# Patient Record
Sex: FEMALE | Race: WHITE | URBAN - METROPOLITAN AREA
[De-identification: names, ages, dates, MRNs, and addresses within clinical notes are randomized per-mention and may not be internally consistent; named-entity substitution may affect disease eponyms.]

---

## 2017-10-31 ENCOUNTER — OFFICE VISIT (OUTPATIENT)
Dept: PEDIATRICS | Facility: CLINIC | Age: 32
End: 2017-10-31
Payer: COMMERCIAL

## 2017-10-31 VITALS
BODY MASS INDEX: 38.42 KG/M2 | HEART RATE: 86 BPM | SYSTOLIC BLOOD PRESSURE: 118 MMHG | DIASTOLIC BLOOD PRESSURE: 82 MMHG | TEMPERATURE: 98.2 F | WEIGHT: 230.6 LBS | HEIGHT: 65 IN | OXYGEN SATURATION: 99 %

## 2017-10-31 DIAGNOSIS — Z00.00 ENCOUNTER FOR ROUTINE ADULT HEALTH EXAMINATION WITHOUT ABNORMAL FINDINGS: Primary | ICD-10-CM

## 2017-10-31 DIAGNOSIS — G43.009 MIGRAINE WITHOUT AURA AND WITHOUT STATUS MIGRAINOSUS, NOT INTRACTABLE: ICD-10-CM

## 2017-10-31 DIAGNOSIS — Z23 NEED FOR PROPHYLACTIC VACCINATION AND INOCULATION AGAINST INFLUENZA: ICD-10-CM

## 2017-10-31 PROCEDURE — 90686 IIV4 VACC NO PRSV 0.5 ML IM: CPT | Performed by: INTERNAL MEDICINE

## 2017-10-31 PROCEDURE — 99395 PREV VISIT EST AGE 18-39: CPT | Mod: 25 | Performed by: INTERNAL MEDICINE

## 2017-10-31 PROCEDURE — 90471 IMMUNIZATION ADMIN: CPT | Performed by: INTERNAL MEDICINE

## 2017-10-31 PROCEDURE — 99213 OFFICE O/P EST LOW 20 MIN: CPT | Mod: 25 | Performed by: INTERNAL MEDICINE

## 2017-10-31 RX ORDER — SUMATRIPTAN 25 MG/1
25-50 TABLET, FILM COATED ORAL
Qty: 18 TABLET | Refills: 1 | Status: SHIPPED | OUTPATIENT
Start: 2017-10-31 | End: 2018-10-24

## 2017-10-31 NOTE — PROGRESS NOTES
Injectable Influenza Immunization Documentation    1.  Is the person to be vaccinated sick today?   No    2. Does the person to be vaccinated have an allergy to a component   of the vaccine?   No  Egg Allergy Algorithm Link    3. Has the person to be vaccinated ever had a serious reaction   to influenza vaccine in the past?   No    4. Has the person to be vaccinated ever had Guillain-Barré syndrome?   No    Form completed by Anat Marti MA 9:59 AM 10/31/2017     Screening Questionnaire for Adult Immunization    Are you sick today?   No   Do you have allergies to medications, food, a vaccine component or latex?   No   Have you ever had a serious reaction after receiving a vaccination?   No   Do you have a long-term health problem with heart disease, lung disease, asthma, kidney disease, metabolic disease (e.g. diabetes), anemia, or other blood disorder?   No   Do you have cancer, leukemia, HIV/AIDS, or any other immune system problem?   No   In the past 3 months, have you taken medications that affect  your immune system, such as prednisone, other steroids, or anticancer drugs; drugs for the treatment of rheumatoid arthritis, Crohn s disease, or psoriasis; or have you had radiation treatments?   No   Have you had a seizure, or a brain or other nervous system problem?   No   During the past year, have you received a transfusion of blood or blood     products, or been given immune (gamma) globulin or antiviral drug?   No   For women: Are you pregnant or is there a chance you could become        pregnant during the next month?   No   Have you received any vaccinations in the past 4 weeks?   No     Immunization questionnaire answers were all negative.        Per orders of Dr. Johnston, injection of Fluzone given by Anat Marti. Patient instructed to remain in clinic for 15 minutes afterwards, and to report any adverse reaction to me immediately.       Screening performed by Anat Marti on 10/31/2017 at 9:59  AM.

## 2017-10-31 NOTE — PROGRESS NOTES
SUBJECTIVE:   CC: Paige Agosto is an 32 year old woman who presents for preventive health visit.     Physical   Annual:     Getting at least 3 servings of Calcium per day::  Yes    Bi-annual eye exam::  Yes    Dental care twice a year::  Yes    Sleep apnea or symptoms of sleep apnea::  Daytime drowsiness    Diet::  Regular (no restrictions)    Duration of exercise::  Other (6 hours per week)    Taking medications regularly::  Yes    Medication side effects::  Other (feeling nausea)    Additional concerns today::  YES (migranes, discuss BC options, needs paperwork filled out/)      PROBLEMS TO ADD ON:    1. BMI health goals (BMI > 37) - plays softball on several teams, will train for a triathlon this winter.  Exercises pretty frequently and stays active.  - In terms of diet, has seen a dietician.  Has been on several diets and counted calories.  Used calorie counting apps.  Has trouble because feels like she is always hungry and never full.  Also, reports binge eating as a coping mechanism for stress.    2. New-onset migraines - Headaches with photosensitivity.  Debilitating.  Needs to nap.  Lasts from hours to up to 1.5 days.  Started having these in the past year.  Has tried OTC tylenol or migraine medication (1 or 2 tabs).  Occurring at increasing frequencies - about 1-3 times per month.  - never had migraines in the past  - no FH of migraines    3. Birth control options - wants OCPs but needs one that is covered.      Today's PHQ-2 Score:   PHQ-2 ( 1999 Pfizer) 10/26/2016   Q1: Little interest or pleasure in doing things 0   Q2: Feeling down, depressed or hopeless 0   PHQ-2 Score 0       Abuse: Current or Past(Physical, Sexual or Emotional)- No  Do you feel safe in your environment - Yes    Social History   Substance Use Topics     Smoking status: Never Smoker     Smokeless tobacco: Never Used     Alcohol use No     The patient does not drink >3 drinks per day nor >7 drinks per week.    Reviewed orders with  "patient.  Reviewed health maintenance and updated orders accordingly - Yes      Mammogram not appropriate for this patient based on age.    Pertinent mammograms are reviewed under the imaging tab.  History of abnormal Pap smear: NO - age 30-65 PAP every 5 years with negative HPV co-testing recommended    Reviewed and updated as needed this visit by clinical staff         Reviewed and updated as needed this visit by Provider        Past Medical History:   Diagnosis Date     Papanicolaou smear of cervix with low grade squamous intraepithelial lesion (LGSIL) 2010    colp MELI I, f/u paps NIL     Rheumatoid arthritis (H)       Past Surgical History:   Procedure Laterality Date     C ANESTH,ARTHROSCOP PROC,ANKLE &/OR FOOT      ankle surgery, right side     DENTAL SURGERY       Obstetric History       T1      L1     SAB1   TAB0   Ectopic0   Multiple0   Live Births1       # Outcome Date GA Lbr Charly/2nd Weight Sex Delivery Anes PTL Lv   2 Term 08        MAURY   1 TAB         DEC          Review of Systems  All other systems on a 10-point review are negative     OBJECTIVE:   /82 (BP Location: Right arm, Patient Position: Chair, Cuff Size: Adult Large)  Pulse 86  Temp 98.2  F (36.8  C) (Oral)  Ht 5' 5\" (1.651 m)  Wt 230 lb 9.6 oz (104.6 kg)  LMP 10/19/2017 (Approximate)  SpO2 99%  BMI 38.37 kg/m2  Physical Exam  GENERAL: healthy, alert and no distress  EYES: Eyes grossly normal to inspection, PERRL and conjunctivae and sclerae normal  HENT: ear canals and TM's normal, nose and mouth without ulcers or lesions  NECK: no adenopathy, no asymmetry, masses, or scars and thyroid normal to palpation  RESP: lungs clear to auscultation - no rales, rhonchi or wheezes  CV: regular rate and rhythm, normal S1 S2, no S3 or S4, no murmur, click or rub, no peripheral edema and peripheral pulses strong  ABDOMEN: soft, nontender, no hepatosplenomegaly, no masses and bowel sounds normal  MS: no gross " "musculoskeletal defects noted, no edema  SKIN: no suspicious lesions or rashes  NEURO: Normal strength and tone, mentation intact and speech normal  PSYCH: mentation appears normal, affect normal/bright    ASSESSMENT/PLAN:   1. Encounter for routine adult health examination without abnormal findings    2. BMI 37.0-37.9, adult  Counseled patient on diet and exercise strategies.  Has already seen nutritionist and is physically active.  - ENDOCRINOLOGY ADULT REFERRAL    3. Migraine without aura and without status migrainosus, not intractable  Has not tried appropriate dosing for OTC abortive medications  - SUMAtriptan (IMITREX) 25 MG tablet; Take 1-2 tablets (25-50 mg) by mouth at onset of headache for migraine May repeat in 2 hours. Max 8 tablets/24 hours.  Dispense: 18 tablet; Refill: 1    4. Need for prophylactic vaccination and inoculation against influenza  - FLU VAC, SPLIT VIRUS IM > 3 YO (QUADRIVALENT) [44832]  - Vaccine Administration, Initial [24410]    COUNSELING:  Reviewed preventive health counseling, as reflected in patient instructions  Special attention given to:        Regular exercise       Healthy diet/nutrition         reports that she has never smoked. She has never used smokeless tobacco.    Estimated body mass index is 38.05 kg/(m^2) as calculated from the following:    Height as of 10/26/16: 5' 5.5\" (1.664 m).    Weight as of 10/26/16: 232 lb 3.2 oz (105.3 kg).   Weight management plan: Patient referred to endocrine and/or weight management specialty    Counseling Resources:  ATP IV Guidelines  Pooled Cohorts Equation Calculator  Breast Cancer Risk Calculator  FRAX Risk Assessment  ICSI Preventive Guidelines  Dietary Guidelines for Americans, 2010  USDA's MyPlate  ASA Prophylaxis  Lung CA Screening    Jeremy Johnston MD  [facility]  "

## 2017-10-31 NOTE — MR AVS SNAPSHOT
After Visit Summary   10/31/2017    Paige Agosto    MRN: [id number]           Patient Information     Date Of Birth          1985        Visit Information        Provider Department      10/31/2017 8:50 AM Caty Johnston Mai, MD [facility]        Today's Diagnoses     Encounter for routine adult health examination without abnormal findings    -  1    BMI 37.0-37.9, adult        Migraine without aura and without status migrainosus, not intractable          Care Instructions      1. Weight loss plan - continue calorie counting and healthy dietary choices, continue exercise regimen, referral to endocrinology  2. Migraines - Start migraine diary.  Try 1000 mg of tylenol at the first sign of migraine and then 650 - 1000 mg every 4 hours after for 24 hours.  If no improvement, fill script sumatriptan.  Follow-up with me in 3 - 4 weeks.  3. I will contact you once I determined the one that is covered.    Preventive Health Recommendations  Female Ages 26 - 39  Yearly exam:   See your health care provider every year in order to    Review health changes.     Discuss preventive care.      Review your medicines if you your doctor has prescribed any.    Until age 30: Get a Pap test every three years (more often if you have had an abnormal result).    After age 30: Talk to your doctor about whether you should have a Pap test every 3 years or have a Pap test with HPV screening every 5 years.   You do not need a Pap test if your uterus was removed (hysterectomy) and you have not had cancer.  You should be tested each year for STDs (sexually transmitted diseases), if you're at risk.   Talk to your provider about how often to have your cholesterol checked.  If you are at risk for diabetes, you should have a diabetes test (fasting glucose).  Shots: Get a flu shot each year. Get a tetanus shot every 10 years.   Nutrition:     Eat at least 5 servings of fruits and vegetables each day.    Eat whole-grain  bread, whole-wheat pasta and brown rice instead of white grains and rice.    Talk to your provider about Calcium and Vitamin D.     Lifestyle    Exercise at least 150 minutes a week (30 minutes a day, 5 days of the week). This will help you control your weight and prevent disease.    Limit alcohol to one drink per day.    No smoking.     Wear sunscreen to prevent skin cancer.    See your dentist every six months for an exam and cleaning.            Follow-ups after your visit        Additional Services     ENDOCRINOLOGY ADULT REFERRAL       Your provider has referred you to: [facility]: [facility] Raul Campbell [phone]   [email or web address]      Please be aware that coverage of these services is subject to the terms and limitations of your health insurance plan.  Call member services at your health plan with any benefit or coverage questions.      Please bring the following to your appointment:    >>   Any x-rays, CTs or MRIs which have been performed.  Contact the facility where they were done to arrange for  prior to your scheduled appointment.    >>   List of current medications   >>   This referral request   >>   Any documents/labs given to you for this referral                  Who to contact     If you have questions or need follow up information about today's clinic visit or your schedule please contact [facility] directly at [phone].  Normal or non-critical lab and imaging results will be communicated to you by MyChart, letter or phone within 4 business days after the clinic has received the results. If you do not hear from us within 7 days, please contact the clinic through MyChart or phone. If you have a critical or abnormal lab result, we will notify you by phone as soon as possible.  Submit refill requests through [email or web address] or call your pharmacy and they will forward the refill request to us. Please allow 3 business days for your refill to be completed.        "   Additional Information About Your Visit        MyChart Information     [employer]kle lets you send messages to your doctor, view your test results, renew your prescriptions, schedule appointments and more. To sign up, go to [email or web address] . Click on \"Log in\" on the left side of the screen, which will take you to the Welcome page. Then click on \"Sign up Now\" on the right side of the page.     You will be asked to enter the access code listed below, as well as some personal information. Please follow the directions to create your username and password.     Your access code is: [email or web address]  Expires: 2018  9:48 AM     Your access code will  in 90 days. If you need help or a new code, please call your [facility] clinic or [phone].        Care EveryWhere ID     This is your Care EveryWhere ID. This could be used by other organizations to access your [facility] medical records  [id number]        Your Vitals Were     Pulse Temperature Height Last Period Pulse Oximetry BMI (Body Mass Index)    86 98.2  F (36.8  C) (Oral) 5' 5\" (1.651 m) 10/19/2017 (Approximate) 99% 38.37 kg/m2       Blood Pressure from Last 3 Encounters:   10/31/17 118/82   16 112/86   10/26/16 120/80    Weight from Last 3 Encounters:   10/31/17 230 lb 9.6 oz (104.6 kg)   10/26/16 232 lb 3.2 oz (105.3 kg)   09/10/15 218 lb 9.6 oz (99.2 kg)              We Performed the Following     ENDOCRINOLOGY ADULT REFERRAL          Today's Medication Changes          These changes are accurate as of: 10/31/17  9:48 AM.  If you have any questions, ask your nurse or doctor.               Start taking these medicines.        Dose/Directions    SUMAtriptan 25 MG tablet   Commonly known as:  IMITREX   Used for:  Migraine without aura and without status migrainosus, not intractable   Started by:  Caty Johnston Mai, MD        Dose:  25-50 mg   Take 1-2 tablets (25-50 mg) by mouth at onset of headache for migraine May repeat in 2 hours. Max 8 " tablets/24 hours.   Quantity:  18 tablet   Refills:  1            Where to get your medicines      These medications were sent to [facility] - [address], [facility] [address]     Phone:  [phone]     SUMAtriptan 25 MG tablet                Primary Care Provider Office Phone # Fax #    Silva PONCE MD Jim [phone]       [facility] [address] [place] [address]        Equal Access to Services     CLEMENCIA [facility] : Hadii aad ku hadasho Soomaali, waaxda luqadaha, qaybta kaalmada adeegyada, waxay idiin hayaan adeeg kharayusef gottlieb . So [facility] [phone].    ATENCIÓN: Si habla español, tiene a groves disposición servicios gratuitos de asistencia lingüística. [facility] [phone].    We comply with applicable federal civil rights laws and [place] laws. We do not discriminate on the basis of race, color, national origin, age, disability, sex, sexual orientation, or gender identity.            Thank you!     Thank you for choosing [facility]  for your care. Our goal is always to provide you with excellent care. Hearing back from our patients is one way we can continue to improve our services. Please take a few minutes to complete the written survey that you may receive in the mail after your visit with us. Thank you!             Your Updated Medication List - Protect others around you: Learn how to safely use, store and throw away your medicines at www.disposemymeds.org.          This list is accurate as of: 10/31/17  9:48 AM.  Always use your most recent med list.                   Brand Name Dispense Instructions for use Diagnosis    albuterol 108 (90 BASE) MCG/ACT Inhaler    PROAIR HFA/PROVENTIL HFA/VENTOLIN HFA     Inhale 2 puffs into the lungs every 6 hours        azithromycin 250 MG tablet    ZITHROMAX    6 tablet    Two tablets first day, then one tablet daily for four days.    Acute bronchitis with symptoms >  10 days       FOLIC ACID PO      Take 1 mg by mouth daily        METHOTREXATE PO           norgestimate-ethinyl estradiol 0.25-35 MG-MCG per tablet    ORTHO-CYCLEN, SPRINTEC    84 tablet    Take 1 tablet by mouth daily    Encounter for surveillance of contraceptive pills       predniSONE 5 MG tablet    DELTASONE     Take 1 tablet (5 mg) by mouth daily    RA (rheumatoid arthritis) (H)       SUMAtriptan 25 MG tablet    IMITREX    18 tablet    Take 1-2 tablets (25-50 mg) by mouth at onset of headache for migraine May repeat in 2 hours. Max 8 tablets/24 hours.    Migraine without aura and without status migrainosus, not intractable       traMADol 50 MG tablet    ULTRAM     Take 1 tablet by mouth every 6 hours as needed

## 2017-10-31 NOTE — NURSING NOTE
"Chief Complaint   Patient presents with     Physical       Initial /82 (BP Location: Right arm, Patient Position: Chair, Cuff Size: Adult Large)  Pulse 86  Temp 98.2  F (36.8  C) (Oral)  Ht 5' 5\" (1.651 m)  Wt 230 lb 9.6 oz (104.6 kg)  LMP 10/19/2017 (Approximate)  SpO2 99%  BMI 38.37 kg/m2 Estimated body mass index is 38.37 kg/(m^2) as calculated from the following:    Height as of this encounter: 5' 5\" (1.651 m).    Weight as of this encounter: 230 lb 9.6 oz (104.6 kg).  Medication Reconciliation: complete   Anat Marti MA 9:06 AM 10/31/2017     "

## 2017-10-31 NOTE — PATIENT INSTRUCTIONS
1. Weight loss plan - continue calorie counting and healthy dietary choices, continue exercise regimen, referral to endocrinology  2. Migraines - Start migraine diary.  Try 1000 mg of tylenol at the first sign of migraine and then 650 - 1000 mg every 4 hours after for 24 hours.  If no improvement, fill script sumatriptan.  Follow-up with me in 3 - 4 weeks.  3. I will contact you once I determined the one that is covered.    Preventive Health Recommendations  Female Ages 26 - 39  Yearly exam:   See your health care provider every year in order to    Review health changes.     Discuss preventive care.      Review your medicines if you your doctor has prescribed any.    Until age 30: Get a Pap test every three years (more often if you have had an abnormal result).    After age 30: Talk to your doctor about whether you should have a Pap test every 3 years or have a Pap test with HPV screening every 5 years.   You do not need a Pap test if your uterus was removed (hysterectomy) and you have not had cancer.  You should be tested each year for STDs (sexually transmitted diseases), if you're at risk.   Talk to your provider about how often to have your cholesterol checked.  If you are at risk for diabetes, you should have a diabetes test (fasting glucose).  Shots: Get a flu shot each year. Get a tetanus shot every 10 years.   Nutrition:     Eat at least 5 servings of fruits and vegetables each day.    Eat whole-grain bread, whole-wheat pasta and brown rice instead of white grains and rice.    Talk to your provider about Calcium and Vitamin D.     Lifestyle    Exercise at least 150 minutes a week (30 minutes a day, 5 days of the week). This will help you control your weight and prevent disease.    Limit alcohol to one drink per day.    No smoking.     Wear sunscreen to prevent skin cancer.    See your dentist every six months for an exam and cleaning.

## 2017-10-31 NOTE — Clinical Note
Please call pt and let her know that I did some research and the only way to determine which OCP is covered is for her to call her insurance company to find out (or go online to their formulary).  Her pharmacy should be able to help her determine that as well.  Alternatively, I can re-route her prescription to our pharmacy at [facility] and they can help her figure out which OCP is covered too.  Please let me know what she prefers.  Thank you.

## 2017-11-03 NOTE — PROGRESS NOTES
LM again asking pt to call back to discuss OCP options    Clair Hutchins MA   November 3, 2017,  4:05 PM

## 2018-03-20 ENCOUNTER — TRANSFERRED RECORDS (OUTPATIENT)
Dept: HEALTH INFORMATION MANAGEMENT | Facility: CLINIC | Age: 33
End: 2018-03-20

## 2018-03-26 ENCOUNTER — TRANSFERRED RECORDS (OUTPATIENT)
Dept: HEALTH INFORMATION MANAGEMENT | Facility: CLINIC | Age: 33
End: 2018-03-26

## 2018-03-26 LAB
ALT SERPL-CCNC: 37 IU/L (ref 0–32)
AST SERPL-CCNC: 26 IU/L (ref 0–40)
CREAT SERPL-MCNC: 0.74 MG/DL (ref 0.57–1)
GFR SERPL CREATININE-BSD FRML MDRD: 106 ML/MIN/1.73M2

## 2018-03-27 PROBLEM — G43.009 MIGRAINE WITHOUT AURA AND WITHOUT STATUS MIGRAINOSUS, NOT INTRACTABLE: Status: ACTIVE | Noted: 2018-03-27

## 2018-04-10 ENCOUNTER — OFFICE VISIT (OUTPATIENT)
Dept: ENDOCRINOLOGY | Facility: CLINIC | Age: 33
End: 2018-04-10
Payer: COMMERCIAL

## 2018-04-10 VITALS
TEMPERATURE: 98.2 F | BODY MASS INDEX: 39.27 KG/M2 | WEIGHT: 235.7 LBS | HEART RATE: 78 BPM | OXYGEN SATURATION: 99 % | HEIGHT: 65 IN | SYSTOLIC BLOOD PRESSURE: 118 MMHG | DIASTOLIC BLOOD PRESSURE: 82 MMHG

## 2018-04-10 DIAGNOSIS — E66.812 CLASS 2 OBESITY WITHOUT SERIOUS COMORBIDITY WITH BODY MASS INDEX (BMI) OF 39.0 TO 39.9 IN ADULT, UNSPECIFIED OBESITY TYPE: Primary | ICD-10-CM

## 2018-04-10 PROCEDURE — 99203 OFFICE O/P NEW LOW 30 MIN: CPT | Performed by: INTERNAL MEDICINE

## 2018-04-10 NOTE — NURSING NOTE
"Chief Complaint   Patient presents with     Referral     Dr Johnston for weight loss        Initial /82 (BP Location: Right arm, Patient Position: Chair, Cuff Size: Adult Large)  Pulse 78  Temp 98.2  F (36.8  C) (Oral)  Ht 1.638 m (5' 4.5\")  Wt 106.9 kg (235 lb 11.2 oz)  SpO2 99%  BMI 39.83 kg/m2 Estimated body mass index is 39.83 kg/(m^2) as calculated from the following:    Height as of this encounter: 1.638 m (5' 4.5\").    Weight as of this encounter: 106.9 kg (235 lb 11.2 oz).  Medication Reconciliation: complete     ENDOCRINOLOGY INTAKE FORM    Patient Name:  Paige Agosto  :  1985    Is patient Diabetic?   No  Does patient have non-diabetic or other endocrine issues?  Yes: weight     Vitals: /82 (BP Location: Right arm, Patient Position: Chair, Cuff Size: Adult Large)  Pulse 78  Temp 98.2  F (36.8  C) (Oral)  Ht 1.638 m (5' 4.5\")  Wt 106.9 kg (235 lb 11.2 oz)  SpO2 99%  BMI 39.83 kg/m2  BMI= Body mass index is 39.83 kg/(m^2).    Flu vaccine:  Yes: 10/31/17  Pneumonia vaccine:  No    Smoking and Alcohol use:  Social History   Substance Use Topics     Smoking status: Never Smoker     Smokeless tobacco: Never Used     Alcohol use No       Lab Results   Component Value Date    A1C 5.2 09/10/2015       No results found for: MICROL  No results found for: MICROALBUMIN    OBESITY CONCERNS:  No ref. provider found       Initial Visit Previous Visit Current Change   Weight   106.9kg    Height   5' 4.5\"    BMI         Weight at graduation of high school:   Diabetes:  No  Sleep Apnea/Snores: No  Hypertension:  No  Hyperlipidemia:  No  Use of steroids:  YES every 3 months or so   Family history of obesity:  Yes: not in immediate family   Diet:  Good/poor  Exercise: Yes:     Staff Signature:  Clair Farley CMA ([facility])        "

## 2018-04-10 NOTE — PROGRESS NOTES
Name: Paige Agosto  Seen in consultation with Silva Fernandez for obesity.  HPI:  Paige Agosto is a 33 year old female who presents for the evaluation of obestiy.  Body mass index is 39.83 kg/(m^2).  Wt Readings from Last 2 Encounters:   04/10/18 106.9 kg (235 lb 11.2 oz)   10/31/17 104.6 kg (230 lb 9.6 oz)     Past medical history includes hyperlipidemia, low-grade squamous intraepithelial lesion on Pap smear, rheumatoid arthritis, migraine without aura and chronic steroid use in the setting of rheumatoid arthritis.    Was thin growing up.  Wt gain started in college- more fast food.  And wt gain stared around that time.    Highest weight as an adult: 240  Lowest weight as an adult:203 ( on ww in 2015)  Diets tried: ww- lost and gained wt.  Was in a study- [employer] study- lost wt and gained back. At that time she was on [employer].    Gastric bypass history: no    Hypothyroidism: no-- not on thyroid medication.    Use of Steroids:Yes: She is on chronic prednisone for rheumatoid arthritis-- takes it for flare up- once every 3 months- for about 1-2 weeks. Last use was > 1 month back. march 11th.  Family history of Obesity:Yes: sister- overwt  Diet: currently pt is working on-- trying to eat healthy-- for the most part. About 2000 fior/day  Exercise:training for traithlon- swimming, running and biking every 2-3 days. Will be starting softball.  Menses: on OC pills. 1 child.  Diarrhea/Constipation:No  Changes in Hair or Skin:No skin, + hair fall- was seen by derm.  Diabetes:No  Sleep Apnea/Snores:No.  Hypertension:No  Hyperlipidemia:Yes: not on medication  PMH/PSH:  Past Medical History:   Diagnosis Date     Papanicolaou smear of cervix with low grade squamous intraepithelial lesion (LGSIL) 5/2010    colp MELI I, f/u paps NIL     Rheumatoid arthritis (H)      Past Surgical History:   Procedure Laterality Date     C ANESTH,ARTHROSCOP PROC,ANKLE &/OR FOOT      ankle surgery, right side     DENTAL SURGERY       Family  "Hx:  Family History   Problem Relation Age of Onset     DIABETES Father      Prostate Cancer Father      Bladder Cancer Father      Rectal Cancer Father      CANCER Paternal Grandfather      Lung     Alcohol/Drug Sister      Breast Cancer Paternal Grandmother      Breast Cancer Cousin 26     Social Hx:  Social History     Social History     Marital status: Single     Spouse name: N/A     Number of children: N/A     Years of education: N/A     Occupational History     Not on file.     Social History Main Topics     Smoking status: Never Smoker     Smokeless tobacco: Never Used     Alcohol use No     Drug use: No     Sexual activity: Yes     Partners: Male     Birth control/ protection: Pill     Other Topics Concern     Parent/Sibling W/ Cabg, Mi Or Angioplasty Before 65f 55m? No     Social History Narrative          MEDICATIONS:  has a current medication list which includes the following prescription(s): sumatriptan, albuterol, folic acid, methotrexate, norgestimate-ethinyl estradiol, prednisone, and tramadol.    ROS     ROS: 10 point ROS neg other than the symptoms noted above in the HPI.    Physical Exam   VS: /82 (BP Location: Right arm, Patient Position: Chair, Cuff Size: Adult Large)  Pulse 78  Temp 98.2  F (36.8  C) (Oral)  Ht 1.638 m (5' 4.5\")  Wt 106.9 kg (235 lb 11.2 oz)  SpO2 99%  BMI 39.83 kg/m2  GENERAL: AXOX3, NAD, well dressed, answering questions appropriately, appears stated age.  HEENT: No exopthalmous, no proptosis, EOMI, no lig lag, no retraction  NECK: Thyroid normal in size, non tender, no nodules were palpated.  CV: RRR, no rubs, gallops, no murmurs  LUNGS: CTAB, no wheezes, rales, or ronchi  ABDOMEN: +BS. + stretch marks but not hyperpigmented.  EXTREMITIES: no edema, +pulses, no rashes, no lesions  NEUROLOGY: CN grossly intact, + DTR upper and lower extremity, no tremors  MSK: grossly intact  SKIN: no rashes, no lesions    LABS:  Last Basic Metabolic Panel:  Lab Results   Component " Value Date     04/28/2011      Lab Results   Component Value Date    POTASSIUM 3.4 04/28/2011     Lab Results   Component Value Date    CHLORIDE 105 04/28/2011     Lab Results   Component Value Date    DAQUAN 8.2 04/28/2011     Lab Results   Component Value Date    CO2 23 04/28/2011     Lab Results   Component Value Date    BUN 7 04/28/2011     Lab Results   Component Value Date    CR 0.58 04/28/2011     Lab Results   Component Value Date    GLC 88 04/28/2011       Lab Results   Component Value Date    TSH 0.99 09/10/2015       Lab Results   Component Value Date    A1C 5.2 09/10/2015         All pertinent notes, labs, and images personally reviewed by me.   All pertinent notes, labs and reports done at outside clinic personally reviewed by me through care everywhere.      A/P  Ms.Teresa TINO Agosto is a 33 year old here for the evaluation of obestiy:    1. Obesity-    Body mass index is 39.83 kg/(m^2).  Obesity is associated with a significant increase in mortality and risk of many disorders, including diabetes mellitus, hypertension, dyslipidemia, heart disease, stroke, sleep apnea, cancer, and many others. Conversely, weight loss is associated with a reduction in obesity-associated morbidity.  Endocrine evaluation of obesity includes Cushing's and thyroid dysfunction.  Will obtain TSH and freeT4 along with 24 hour urine collection.   She reports that she gets annual screening at her workplace and A1c in August 2017 was in normal range  Of note she is taking prednisone as needed for flare of rheumatoid arthritis.  Last use was almost 1 month back.  Will get an 24 hour urine collection for cortisol.  Check thyroid function test.  Discussed dietitian referral.  She will think about it.  She does not snore or has history of sleep apnea.  Consider weight loss medication after above.  The patient is advised to Make better food choices: reduce carbs, Reduce portion size, weight loss and exercise 3-4 times a  week.    Discussed:  I informed the pt that:  1.Weight loss medications can be taken to assist with weight reduction when combined with appropriate healthy lifestyle changes.  2.I discussed possible s/e, risks and benefits of weight loss medications.  3.All medications are FDA approved, however, some may be used ''off label'' for their weight loss benefitIs and some ''short term'' medications can be used for longer periods to achieve desired clinical outcomes.  4.All patients taking weight loss medications must be seen in clinic for refill authorization.  Risks of obestiy discussed. Encourage healthy diet. Limit snacks, fluid calories, portions. Limit TV/computer time to one hour a day. Encourage physical activity. Recheck in six months or sooner with concerns.    Obesity is a biological preventable and treatable disease, which is associated with significantly increased risk of many acute and chronic health conditions. Obesity has now been recognized as a chronic disease by the American Medical Association.    A range of serious co-morbidities are associated with obesity including increased risk for hypertension, stroke, coronary artery disease, dyslipidemia, Type II diabetes, depression, sleep apnea, cancers of the colon, breast and endometrium, osteoarthritis and female infertility. Therefore, obesity is not just a problem; it s a disease that warrants serious evidence based treatements.    I explained to the patient relevant work up for the diagnosis and management of obesity and discussed treatment options. Body Mass Index (BMI) has been a standard means for calculating risk for overweight and obesity. The new American Association of Clinical Endocrinology (AACE) algorithm recommends lifestyle modifications as the initial phase of treatment for all patients with the BMI equal or greater than 25 kg/m2. Lifestyle modifications includes use of medical nutrition therapy, exercise, tobacco cessation, adequate quality  and quantity of sleep, limited consumption of alcohol and reduced stress through implementation of a structured, multidisciplinary program.    In patients with complications associated with obesity, graded interventions are recommended including pharmacological therapy such as phentermine, orlistat, lorcaserin and phentermine/topiramate ER, contrave ( buproprion/naltreone) and the use of very low calorie meal replacement programs.    If medical intervention is insufficient, surgical therapy may be considered, especially in patients with BMI greater than or equal to 35 kg/m2 with multiple complications. Surgical treatments include lap-band, gastric sleeve or gastric bypass surgery.      More than 50% of the time spent with Ms. Agosto on counseling / coordinating her care.  Total appointment time was 30 minutes.      Follow-up:  After above    Angie Vail MD  Endocrinology   [facility]    CC: Silva Fernandez    Addendum to above note and clinic visit:    Labs reviewed.    See result note/telephone encounter.

## 2018-04-10 NOTE — MR AVS SNAPSHOT
After Visit Summary   4/10/2018    Paige Agosto    MRN: [id number]           Patient Information     Date Of Birth          1985        Visit Information        Provider Department      4/10/2018 3:30 PM Angie Vail MD [facility]        Today's Diagnoses     Class 2 obesity without serious comorbidity with body mass index (BMI) of 39.0 to 39.9 in adult, unspecified obesity type    -  1      Care Instructions    [facility]   Dr Vail, Endocrinology Department      [facility]   [address]  [place] [address]  Appointment [phone]  Fax: [phone]  [place]: Monday and Tuesday         [place] [address]  [place] [address]  Appointment [phone]  Fax: [phone]  [place]: Wednesday and Thursday            Labs today  Follow up with dietician  Follow up in 4 weeks    24 Hour Urine Collection--> do it after 1 week- at least 1 month after last prednisone use.    - During a 24-hour urine collection, follow your usual diet and drink fluids as you ordinarily would.  - Avoid drinking alcohol before and during the urine collection.    - For a 24-hour urine collection, all of the urine that you pass over a 24-hour time period must be collected. You will receive a special container to collect the sample.    The following are directions for collecting a 24-hour urine sample while at home:    In the morning scheduled to begin the urine collection, urinate in the toilet and flush away the first urine you pass. Write down the date and time. That is the start date and time for the collection.    Collect all urine you pass, day and night, for 24 hours. Use the container given to you to collect the urine. Avoid using other containers. The urine sample must include the last urine that you pass 24 hours after starting the collection.  Do not allow toilet paper, stool, or anything else to be added to the urine sample.    Write down the date and time that the last sample is collected.            Follow-ups after your visit        Additional Services     NUTRITION REFERRAL       Your provider has referred you to: [place]: Lydia Tolbert/ [place] [phone]   [email or web address]    Please be aware that coverage of these services is subject to the terms and limitations of your health insurance plan.  Call member services at your health plan with any benefit or coverage questions.      Please bring the following to your appointment:      >>   This referral request   >>   Any documents given to you for this referral  >>   Any specific questions you have about diet or food choices                  Future tests that were ordered for you today     Open Future Orders        Priority Expected Expires Ordered    Cortisol free urine Routine  4/11/2019 4/10/2018            Who to contact     If you have questions or need follow up information about today's clinic visit or your schedule please contact [facility] directly at [phone].  Normal or non-critical lab and imaging results will be communicated to you by [employer], letter or phone within 4 business days after the clinic has received the results. If you do not hear from us within 7 days, please contact the clinic through [employer] or phone. If you have a critical or abnormal lab result, we will notify you by phone as soon as possible.  Submit refill requests through [employer] or call your pharmacy and they will forward the refill request to us. Please allow 3 business days for your refill to be completed.          Additional Information About Your Visit        [employer] Information     [employer] lets you send messages to your doctor, view your test results, renew your prescriptions, schedule appointments and more. To sign up, go to  [email or web address] . Click on \"Log in\" on the left side of the screen, which will take you to the Welcome page. Then click on \"Sign up Now\" on the right side of the page.     You will be asked to enter the access code listed below, as well as some personal information. Please follow the directions to create your username and password.     Your access code is: [email or web address]  Expires: 2018  4:08 PM     Your access code will  in 90 days. If you need help or a new code, please call your [facility] clinic or [phone].        Care EveryWhere ID     This is your Care EveryWhere ID. This could be used by other organizations to access your [facility] medical records  [id number]        Your Vitals Were     Pulse Temperature Height Pulse Oximetry BMI (Body Mass Index)       78 98.2  F (36.8  C) (Oral) 1.638 m (5' 4.5\") 99% 39.83 kg/m2        Blood Pressure from Last 3 Encounters:   04/10/18 118/82   10/31/17 118/82   16 112/86    Weight from Last 3 Encounters:   04/10/18 106.9 kg (235 lb 11.2 oz)   10/31/17 104.6 kg (230 lb 9.6 oz)   10/26/16 105.3 kg (232 lb 3.2 oz)              We Performed the Following     NUTRITION REFERRAL     TSH with free T4 reflex        Primary Care Provider Office Phone # Fax #    Silva KRIS Fernandez [phone]       [facility] [address] [place] [address]        Equal Access to Services     [facility]: Hadii roosevelt garcia hadashnevaeh Sonorm, waaxda luqadaha, qaybta kaalindia brownlee. So [facility] [phone].    ATENCIÓN: Si habla español, tiene a groves disposición servicios gratuitos de asistencia lingüística. Llame al [phone].    We comply with applicable federal civil rights laws and [place] laws. We do not discriminate on the basis of race, color, national origin, age, disability, sex, sexual orientation, or gender identity.            Thank you!     Thank you for choosing [facility]  for your " care. Our goal is always to provide you with excellent care. Hearing back from our patients is one way we can continue to improve our services. Please take a few minutes to complete the written survey that you may receive in the mail after your visit with us. Thank you!             Your Updated Medication List - Protect others around you: Learn how to safely use, store and throw away your medicines at www.disposemymeds.org.          This list is accurate as of 4/10/18  4:13 PM.  Always use your most recent med list.                   Brand Name Dispense Instructions for use Diagnosis    albuterol 108 (90 Base) MCG/ACT Inhaler    PROAIR HFA/PROVENTIL HFA/VENTOLIN HFA     Inhale 2 puffs into the lungs every 6 hours        FOLIC ACID PO      Take 1 mg by mouth daily        METHOTREXATE PO           norgestimate-ethinyl estradiol 0.25-35 MG-MCG per tablet    ORTHO-CYCLEN, SPRINTEC    84 tablet    Take 1 tablet by mouth daily    Encounter for surveillance of contraceptive pills       predniSONE 5 MG tablet    DELTASONE     Take 1 tablet (5 mg) by mouth daily    RA (rheumatoid arthritis) (H)       SUMAtriptan 25 MG tablet    IMITREX    18 tablet    Take 1-2 tablets (25-50 mg) by mouth at onset of headache for migraine May repeat in 2 hours. Max 8 tablets/24 hours.    Migraine without aura and without status migrainosus, not intractable       traMADol 50 MG tablet    ULTRAM     Take 1 tablet by mouth every 6 hours as needed

## 2018-04-10 NOTE — PATIENT INSTRUCTIONS
[facility]   Dr Vail, Endocrinology Department      [facility]   [address]  [place] [address]  Appointment [phone]  Fax: [phone]  [place]: Monday and Tuesday         [facility]   [address]  [place] [address]  Appointment [phone]  Fax: [phone]  [place]: Wednesday and Thursday            Labs today  Follow up with dietician  Follow up in 4 weeks    24 Hour Urine Collection--> do it after 1 week- at least 1 month after last prednisone use.    - During a 24-hour urine collection, follow your usual diet and drink fluids as you ordinarily would.  - Avoid drinking alcohol before and during the urine collection.    - For a 24-hour urine collection, all of the urine that you pass over a 24-hour time period must be collected. You will receive a special container to collect the sample.    The following are directions for collecting a 24-hour urine sample while at home:    In the morning scheduled to begin the urine collection, urinate in the toilet and flush away the first urine you pass. Write down the date and time. That is the start date and time for the collection.    Collect all urine you pass, day and night, for 24 hours. Use the container given to you to collect the urine. Avoid using other containers. The urine sample must include the last urine that you pass 24 hours after starting the collection. Do not allow toilet paper, stool, or anything else to be added to the urine sample.    Write down the date and time that the last sample is collected.

## 2018-04-10 NOTE — LETTER
4/10/2018         RE: Paige Agosto  [address]  [facility] [address]        Dear Colleague,    Thank you for referring your patient, Paige Agosto, to the [facility]. Please see a copy of my visit note below.    Name: Paige Agosto  Seen in consultation with Silva Fernandez for obesity.  HPI:  Paige Agosto is a 33 year old female who presents for the evaluation of obestiy.  Body mass index is 39.83 kg/(m^2).  Wt Readings from Last 2 Encounters:   04/10/18 106.9 kg (235 lb 11.2 oz)   10/31/17 104.6 kg (230 lb 9.6 oz)     Past medical history includes hyperlipidemia, low-grade squamous intraepithelial lesion on Pap smear, rheumatoid arthritis, migraine without aura and chronic steroid use in the setting of rheumatoid arthritis.    Was thin growing up.  Wt gain started in college- more fast food.  And wt gain stared around that time.    Highest weight as an adult: 240  Lowest weight as an adult:203 ( on ww in 2015)  Diets tried: ww- lost and gained wt.  Was in a study- [employer] study- lost wt and gained back. At that time she was on [place].    Gastric bypass history: no    Hypothyroidism: no-- not on thyroid medication.    Use of Steroids:Yes: She is on chronic prednisone for rheumatoid arthritis-- takes it for flare up- once every 3 months- for about 1-2 weeks. Last use was > 1 month back. march 11th.  Family history of Obesity:Yes: sister- overwt  Diet: currently pt is working on-- trying to eat healthy-- for the most part. About 2000 fior/day  Exercise:training for traithlon- swimming, running and biking every 2-3 days. Will be starting softball.  Menses: on OC pills. 1 child.  Diarrhea/Constipation:No  Changes in Hair or Skin:No skin, + hair fall- was seen by derm.  Diabetes:No  Sleep Apnea/Snores:No.  Hypertension:No  Hyperlipidemia:Yes: not on medication  PMH/PSH:  Past Medical History:   Diagnosis Date     Papanicolaou smear of cervix with low grade squamous intraepithelial  "lesion (LGSIL) 5/2010    colp MELI I, f/u paps NIL     Rheumatoid arthritis (H)      Past Surgical History:   Procedure Laterality Date     C ANESTH,ARTHROSCOP PROC,ANKLE &/OR FOOT      ankle surgery, right side     DENTAL SURGERY       Family Hx:  Family History   Problem Relation Age of Onset     DIABETES Father      Prostate Cancer Father      Bladder Cancer Father      Rectal Cancer Father      CANCER Paternal Grandfather      Lung     Alcohol/Drug Sister      Breast Cancer Paternal Grandmother      Breast Cancer Cousin 26     Social Hx:  Social History     Social History     Marital status: Single     Spouse name: N/A     Number of children: N/A     Years of education: N/A     Occupational History     Not on file.     Social History Main Topics     Smoking status: Never Smoker     Smokeless tobacco: Never Used     Alcohol use No     Drug use: No     Sexual activity: Yes     Partners: Male     Birth control/ protection: Pill     Other Topics Concern     Parent/Sibling W/ Cabg, Mi Or Angioplasty Before 65f 55m? No     Social History Narrative          MEDICATIONS:  has a current medication list which includes the following prescription(s): sumatriptan, albuterol, folic acid, methotrexate, norgestimate-ethinyl estradiol, prednisone, and tramadol.    ROS     ROS: 10 point ROS neg other than the symptoms noted above in the HPI.    Physical Exam   VS: /82 (BP Location: Right arm, Patient Position: Chair, Cuff Size: Adult Large)  Pulse 78  Temp 98.2  F (36.8  C) (Oral)  Ht 1.638 m (5' 4.5\")  Wt 106.9 kg (235 lb 11.2 oz)  SpO2 99%  BMI 39.83 kg/m2  GENERAL: AXOX3, NAD, well dressed, answering questions appropriately, appears stated age.  HEENT: No exopthalmous, no proptosis, EOMI, no lig lag, no retraction  NECK: Thyroid normal in size, non tender, no nodules were palpated.  CV: RRR, no rubs, gallops, no murmurs  LUNGS: CTAB, no wheezes, rales, or ronchi  ABDOMEN: +BS. + stretch marks but not " hyperpigmented.  EXTREMITIES: no edema, +pulses, no rashes, no lesions  NEUROLOGY: CN grossly intact, + DTR upper and lower extremity, no tremors  MSK: grossly intact  SKIN: no rashes, no lesions    LABS:  Last Basic Metabolic Panel:  Lab Results   Component Value Date     04/28/2011      Lab Results   Component Value Date    POTASSIUM 3.4 04/28/2011     Lab Results   Component Value Date    CHLORIDE 105 04/28/2011     Lab Results   Component Value Date    DAQUAN 8.2 04/28/2011     Lab Results   Component Value Date    CO2 23 04/28/2011     Lab Results   Component Value Date    BUN 7 04/28/2011     Lab Results   Component Value Date    CR 0.58 04/28/2011     Lab Results   Component Value Date    GLC 88 04/28/2011       Lab Results   Component Value Date    TSH 0.99 09/10/2015       Lab Results   Component Value Date    A1C 5.2 09/10/2015         All pertinent notes, labs, and images personally reviewed by me.   All pertinent notes, labs and reports done at outside clinic personally reviewed by me through care everywhere.      A/P  Ms.Teresa TINO Agosto is a 33 year old here for the evaluation of obestiy:    1. Obesity-    Body mass index is 39.83 kg/(m^2).  Obesity is associated with a significant increase in mortality and risk of many disorders, including diabetes mellitus, hypertension, dyslipidemia, heart disease, stroke, sleep apnea, cancer, and many others. Conversely, weight loss is associated with a reduction in obesity-associated morbidity.  Endocrine evaluation of obesity includes Cushing's and thyroid dysfunction.  Will obtain TSH and freeT4 along with 24 hour urine collection.   She reports that she gets annual screening at her workplace and A1c in August 2017 was in normal range  Of note she is taking prednisone as needed for flare of rheumatoid arthritis.  Last use was almost 1 month back.  Will get an 24 hour urine collection for cortisol.  Check thyroid function test.  Discussed dietitian referral.   She will think about it.  She does not snore or has history of sleep apnea.  Consider weight loss medication after above.  The patient is advised to Make better food choices: reduce carbs, Reduce portion size, weight loss and exercise 3-4 times a week.    Discussed:  I informed the pt that:  1.Weight loss medications can be taken to assist with weight reduction when combined with appropriate healthy lifestyle changes.  2.I discussed possible s/e, risks and benefits of weight loss medications.  3.All medications are FDA approved, however, some may be used ''off label'' for their weight loss benefitIs and some ''short term'' medications can be used for longer periods to achieve desired clinical outcomes.  4.All patients taking weight loss medications must be seen in clinic for refill authorization.  Risks of obestiy discussed. Encourage healthy diet. Limit snacks, fluid calories, portions. Limit TV/computer time to one hour a day. Encourage physical activity. Recheck in six months or sooner with concerns.    Obesity is a biological preventable and treatable disease, which is associated with significantly increased risk of many acute and chronic health conditions. Obesity has now been recognized as a chronic disease by the American Medical Association.    A range of serious co-morbidities are associated with obesity including increased risk for hypertension, stroke, coronary artery disease, dyslipidemia, Type II diabetes, depression, sleep apnea, cancers of the colon, breast and endometrium, osteoarthritis and female infertility. Therefore, obesity is not just a problem; it s a disease that warrants serious evidence based treatements.    I explained to the patient relevant work up for the diagnosis and management of obesity and discussed treatment options. Body Mass Index (BMI) has been a standard means for calculating risk for overweight and obesity. The new American Association of Clinical Endocrinology (AACE)  algorithm recommends lifestyle modifications as the initial phase of treatment for all patients with the BMI equal or greater than 25 kg/m2. Lifestyle modifications includes use of medical nutrition therapy, exercise, tobacco cessation, adequate quality and quantity of sleep, limited consumption of alcohol and reduced stress through implementation of a structured, multidisciplinary program.    In patients with complications associated with obesity, graded interventions are recommended including pharmacological therapy such as phentermine, orlistat, lorcaserin and phentermine/topiramate ER, contrave ( buproprion/naltreone) and the use of very low calorie meal replacement programs.    If medical intervention is insufficient, surgical therapy may be considered, especially in patients with BMI greater than or equal to 35 kg/m2 with multiple complications. Surgical treatments include lap-band, gastric sleeve or gastric bypass surgery.      More than 50% of the time spent with Ms. Agosto on counseling / coordinating her care.  Total appointment time was 30 minutes.      Follow-up:  After above    Angie Vail MD  Endocrinology   [facility]    CC: Silva Fernandez    Addendum to above note and clinic visit:    Labs reviewed.    See result note/telephone encounter.            Again, thank you for allowing me to participate in the care of your patient.        Sincerely,        Angie Vail MD

## 2018-04-12 ENCOUNTER — TELEPHONE (OUTPATIENT)
Dept: PEDIATRICS | Facility: CLINIC | Age: 33
End: 2018-04-12

## 2018-04-12 NOTE — TELEPHONE ENCOUNTER
Panel Management Review      Patient has the following on her problem list: None      Composite cancer screening  Chart review shows that this patient is due/due soon for the following None  Summary:    Patient is due/failing the following:   FOLLOW UP    Action needed:   Patient needs office visit for migraine follow up as indicated in 10/31/17 AVS - Instructed to follow up in 3-4 weeks.    Type of outreach:    Phone, left message for patient to call back.     Questions for provider review:    None                                                                                                                                    Anat Marti MA 10:45 AM 4/12/2018      Chart routed to self .

## 2018-04-20 NOTE — TELEPHONE ENCOUNTER
2nd attempt. Called patient and left a voicemail message to return phone call regarding HM and scheduling.   Anat Marti MA 2:06 PM 4/20/2018

## 2018-04-24 NOTE — PROGRESS NOTES
SUBJECTIVE:   Paige Agosto is a 33 year old female who presents to clinic today for the following health issues:    Patient has not started a headache diary.    Migraine follow up: Last OV on 10/30/2017      Duration: 1 year     Frequency/How long do they last: Last for about 24 hours, don't get them very often anymore    Description (location/character/radiation): Headache    Intensity:  moderate    Accompanying signs and symptoms: photosensitivity and can't be around any sounds.     History (similar episodes/previous evaluation): Yes, seen on 10/31/2017    Precipitating or alleviating factors: Unknown to patient    What makes it go away?: Sleeping it off    Therapies tried and outcome: Sumatriptan (helps)     Last eye exam: October 2017 - normal dilated fundoscopic exam at that time.    Patient had labs TSH and T4 done at [facility] approximately around the same time she saw Endo. Patient is wondering about birth control options that are covered by her insurance. Has to be sent to [facility].     Obesity:  Is followed by endocrinology and currently undergoing workup for obesity related illnesses.  Is pending 24 hour urine collection for cortisol.  Has not initiated any weight loss medications at this time.  Weight has been stable since last visit.    Rheumatoid arthritis:  No recent flares.  [facility].  Recently found with mildly elevated ALT now pending abdominal ultrasound rheumatologist has temporarily discontinued methotrexate, however patient dislikes being on chronic immunomodulator drugs and wishing not to restart.    Problem list and histories reviewed & adjusted, as indicated.  Additional history: as documented    Patient Active Problem List   Diagnosis     CARDIOVASCULAR SCREENING; LDL GOAL LESS THAN 160     Rheumatoid arthritis (H)     Chronic steroid use     BRCA1 positive     Mixed hyperlipidemia     Migraine without aura and without status migrainosus, not intractable      Class 2 obesity without serious comorbidity with body mass index (BMI) of 39.0 to 39.9 in adult, unspecified obesity type     Past Surgical History:   Procedure Laterality Date     C ANESTH,ARTHROSCOP PROC,ANKLE &/OR FOOT      ankle surgery, right side     DENTAL SURGERY         Social History   Substance Use Topics     Smoking status: Never Smoker     Smokeless tobacco: Never Used     Alcohol use No     Family History   Problem Relation Age of Onset     DIABETES Father      Prostate Cancer Father      Bladder Cancer Father      Rectal Cancer Father      CANCER Paternal Grandfather      Lung     Alcohol/Drug Sister      Breast Cancer Paternal Grandmother      Breast Cancer Cousin 26         Current Outpatient Prescriptions   Medication Sig Dispense Refill     albuterol (PROAIR HFA, PROVENTIL HFA, VENTOLIN HFA) 108 (90 BASE) MCG/ACT inhaler Inhale 2 puffs into the lungs every 6 hours       predniSONE (DELTASONE) 5 MG tablet Take 1 tablet (5 mg) by mouth daily       SUMAtriptan (IMITREX) 25 MG tablet Take 1-2 tablets (25-50 mg) by mouth at onset of headache for migraine May repeat in 2 hours. Max 8 tablets/24 hours. 18 tablet 1     traMADol (ULTRAM) 50 MG tablet Take 1 tablet by mouth every 6 hours as needed       Biotin w/ Vitamins C & E 1250-7.5-7.5 MCG-MG-UNT CHEW        FOLIC ACID PO Take 1 mg by mouth daily       Methotrexate Sodium (METHOTREXATE PO)        norgestimate-ethinyl estradiol (ORTHO-CYCLEN, SPRINTEC) 0.25-35 MG-MCG per tablet Take 1 tablet by mouth daily (Patient not taking: Reported on 4/10/2018) 84 tablet 3     Allergies   Allergen Reactions     Cats        Reviewed and updated as needed this visit by clinical staff       Reviewed and updated as needed this visit by Provider         ROS:  All other systems on a 10-point review are negative, unless otherwise noted in HPI      OBJECTIVE:     /82 (BP Location: Right arm, Patient Position: Chair, Cuff Size: Adult Large)  Pulse 89  Temp 97.9  F  "(36.6  C) (Oral)  Ht 5' 4.5\" (1.638 m)  Wt 235 lb 1.6 oz (106.6 kg)  SpO2 98%  BMI 39.73 kg/m2  Body mass index is 39.73 kg/(m^2).  GENERAL: healthy, alert and no distress  EYES: Eyes grossly normal to inspection  NECK: no asymmetry, masses, or scars  MS: no gross musculoskeletal defects noted, no edema  SKIN: no suspicious lesions or rashes  NEURO: mentation intact and speech normal    ASSESSMENT/PLAN:     1. Migraine without aura and without status migrainosus, not intractable  Improved significantly with Imitrex.  Frequency has decreased as well.  I was initially concerned about idiopathic intracranial hypertension however recent funduscopic exam was unremarkable and headaches have improved with abortive migraine medications.  Will monitor for now.  --Continue as needed Imitrex    2. Class 2 obesity without serious comorbidity with body mass index (BMI) of 39.0 to 39.9 in adult, unspecified obesity type  Unchanged.  Follow-up with endocrinology for weight loss medications  --Follow-up with me in 6 months    3. Rheumatoid arthritis, involving unspecified site, unspecified rheumatoid factor presence (H)  Discontinued methotrexate per her rheumatologist pending workup of elevated liver enzymes.  I suspect a mildly elevated ALT is due to hypercholesterolemia and nonalcoholic fatty liver disease.  Will continue to follow.  --Discussed importance of developing a patient centered plan with rheumatology regarding permanent discontinuation of methotrexate.      See Patient Instructions    Jeremy Johnston MD  [facility]      Addendum:    BRCA1 positive  Although we did not discuss this during our visit today, I further reviewed the patient's chart after discharge and noted this diagnosis.  This is in the setting of strong family history of breast and ovarian cancer diagnosed at an early age.  Per guidelines, patient should get annual mammography and MRI of breasts bilaterally as well as TV ultrasound for " ovarian cancer surveillance.  --Risk reduction bilateral mastectomy and oophorectomy should be discussed, if not already done  --If patient elects not to have bilateral oophorectomy, surveillance with transvaginal ultrasound every 6 months is recommended.  --Patient previously referred to care coordination as insurance refusing to pay for cancer surveillance.    In a previous note, patient was unable to get insurance to cover these tests which is inappropriate.  Care coordination referral was made at that time however I do not see any follow-up since.  I have placed an order for screening tests and I have reached out to the patient to follow-up (left VM and mychart message).

## 2018-04-26 ENCOUNTER — OFFICE VISIT (OUTPATIENT)
Dept: PEDIATRICS | Facility: CLINIC | Age: 33
End: 2018-04-26
Payer: COMMERCIAL

## 2018-04-26 ENCOUNTER — MYC MEDICAL ADVICE (OUTPATIENT)
Dept: PEDIATRICS | Facility: CLINIC | Age: 33
End: 2018-04-26

## 2018-04-26 VITALS
SYSTOLIC BLOOD PRESSURE: 112 MMHG | OXYGEN SATURATION: 98 % | DIASTOLIC BLOOD PRESSURE: 82 MMHG | BODY MASS INDEX: 39.17 KG/M2 | HEART RATE: 89 BPM | WEIGHT: 235.1 LBS | HEIGHT: 65 IN | TEMPERATURE: 97.9 F

## 2018-04-26 DIAGNOSIS — Z15.09 BRCA1 POSITIVE: ICD-10-CM

## 2018-04-26 DIAGNOSIS — Z15.01 BRCA1 POSITIVE: ICD-10-CM

## 2018-04-26 DIAGNOSIS — M06.9 RHEUMATOID ARTHRITIS, INVOLVING UNSPECIFIED SITE, UNSPECIFIED RHEUMATOID FACTOR PRESENCE: ICD-10-CM

## 2018-04-26 DIAGNOSIS — G43.009 MIGRAINE WITHOUT AURA AND WITHOUT STATUS MIGRAINOSUS, NOT INTRACTABLE: Primary | ICD-10-CM

## 2018-04-26 DIAGNOSIS — E66.812 CLASS 2 OBESITY WITHOUT SERIOUS COMORBIDITY WITH BODY MASS INDEX (BMI) OF 39.0 TO 39.9 IN ADULT, UNSPECIFIED OBESITY TYPE: ICD-10-CM

## 2018-04-26 PROCEDURE — 99213 OFFICE O/P EST LOW 20 MIN: CPT | Performed by: INTERNAL MEDICINE

## 2018-04-26 NOTE — MR AVS SNAPSHOT
After Visit Summary   4/26/2018    Paige Agosto    MRN: [id number]           Patient Information     Date Of Birth          1985        Visit Information        Provider Department      4/26/2018 3:50 PM [facility] Johnston Mai, [facility]        Today's Diagnoses     Migraine without aura and without status migrainosus, not intractable    -  1    Class 2 obesity without serious comorbidity with body mass index (BMI) of 39.0 to 39.9 in adult, unspecified obesity type        Rheumatoid arthritis, involving unspecified site, unspecified rheumatoid factor presence (H)        BRCA1 positive           Follow-ups after your visit        Future tests that were ordered for you today     Open Future Orders        Priority Expected Expires Ordered    MR Breast Bilateral w/o & w Contrast Routine  4/26/2019 4/26/2018    MA Screen Bilateral w/Charles Routine  4/26/2019 4/26/2018    US Transvaginal Non OB Routine  4/26/2019 4/26/2018            Who to contact     If you have questions or need follow up information about today's clinic visit or your schedule please contact [facility] directly at [phone].  Normal or non-critical lab and imaging results will be communicated to you by [employer], letter or phone within 4 business days after the clinic has received the results. If you do not hear from us within 7 days, please contact the clinic through [employer] or phone. If you have a critical or abnormal lab result, we will notify you by phone as soon as possible.  Submit refill requests through [employer]Br[employer] or call your pharmacy and they will forward the refill request to us. Please allow 3 business days for your refill to be completed.          Additional Information About Your Visit        MyChart Information     [employer] gives you secure access to your electronic health record. If you see a primary care provider, you can also send messages to your care team and make appointments. If you have  "questions, please call your primary care clinic.  If you do not have a primary care provider, please call [phone] and they will assist you.        Care EveryWhere ID     This is your Care EveryWhere ID. This could be used by other organizations to access your [facility] medical records  [id number]        Your Vitals Were     Pulse Temperature Height Pulse Oximetry BMI (Body Mass Index)       89 97.9  F (36.6  C) (Oral) 5' 4.5\" (1.638 m) 98% 39.73 kg/m2        Blood Pressure from Last 3 Encounters:   04/26/18 112/82   04/10/18 118/82   10/31/17 118/82    Weight from Last 3 Encounters:   04/26/18 235 lb 1.6 oz (106.6 kg)   04/10/18 235 lb 11.2 oz (106.9 kg)   10/31/17 230 lb 9.6 oz (104.6 kg)               Primary Care Provider Office Phone # Fax #    Silva PONCE MD Jim [phone]       [address] [place] [address]        Equal Access to Services     [facility]: Hadii [facility] madera Sonorm, waaxda lutianna, qaybta tomy [place], [place] gottlieb . So [facility] [phone].    ATENCIÓN: Si habla español, tiene a groves disposición servicios gratuitos de asistencia lingüística. [facility] [phone].    We comply with applicable federal civil rights laws and [place] laws. We do not discriminate on the basis of race, color, national origin, age, disability, sex, sexual orientation, or gender identity.            Thank you!     Thank you for choosing [facility]  for your care. Our goal is always to provide you with excellent care. Hearing back from our patients is one way we can continue to improve our services. Please take a few minutes to complete the written survey that you may receive in the mail after your visit with us. Thank you!             Your Updated Medication List - Protect others around you: Learn how to safely use, store and throw away your medicines at www.disposemymeds.org.          This list is accurate as of 4/26/18  " 6:52 PM.  Always use your most recent med list.                   Brand Name Dispense Instructions for use Diagnosis    albuterol 108 (90 Base) MCG/ACT Inhaler    PROAIR HFA/PROVENTIL HFA/VENTOLIN HFA     Inhale 2 puffs into the lungs every 6 hours        Biotin w/ Vitamins C & E 1250-7.5-7.5 MCG-MG-UNT Chew           FOLIC ACID PO      Take 1 mg by mouth daily        METHOTREXATE PO           norgestimate-ethinyl estradiol 0.25-35 MG-MCG per tablet    ORTHO-CYCLEN, SPRINTEC    84 tablet    Take 1 tablet by mouth daily    Encounter for surveillance of contraceptive pills       predniSONE 5 MG tablet    DELTASONE     Take 1 tablet (5 mg) by mouth daily    RA (rheumatoid arthritis) (H)       SUMAtriptan 25 MG tablet    IMITREX    18 tablet    Take 1-2 tablets (25-50 mg) by mouth at onset of headache for migraine May repeat in 2 hours. Max 8 tablets/24 hours.    Migraine without aura and without status migrainosus, not intractable       traMADol 50 MG tablet    ULTRAM     Take 1 tablet by mouth every 6 hours as needed

## 2018-04-29 ENCOUNTER — HEALTH MAINTENANCE LETTER (OUTPATIENT)
Age: 33
End: 2018-04-29

## 2018-04-30 NOTE — TELEPHONE ENCOUNTER
Please follow-up with Apige regarding the followin. I placed a mammogram and transvaginal ultrasound.  She can call [phone] to ask marli how much this will cost, based on her plan and deductible etc, however this is very important for her to follow-up on.      2. For labs, please ask her to [facility] out [facility] to release her lab information to our clinic.  Otherwise, there is no other way for us to obtain this information.    3. Please let her know that we were able to find information about birth control, but I would need to see her for a separate visit.  If this is too difficult, we can initiate an e-visit or phone visit, but I will still need a urine pregnancy test within the next few weeks.

## 2018-04-30 NOTE — TELEPHONE ENCOUNTER
Called and left message for patient to give us a call back and/or to check her [employer] messages for information regarding several topics. Dr. Johnston sent two [employer] messages and I sent one, but all are still unread.   Anat Marti MA 4:50 PM 4/30/2018

## 2018-05-01 NOTE — TELEPHONE ENCOUNTER
Should the patient respond/decide they want birth control. The following are options for birth control that are preferred/covered are:  - Levonor/ethiyl estradiol  - Cryselle 28  - Norethindron -ethin estradiol  -Junel 1.5/30  - Sprintec 28  - Nortrel  - Drospir/ethi tab 3-0.03 mg  - Herbie Mijares 1/35 (28)    Anat Marti MA 2:36 PM 5/1/2018

## 2018-10-24 ENCOUNTER — OFFICE VISIT (OUTPATIENT)
Dept: FAMILY MEDICINE | Facility: CLINIC | Age: 33
End: 2018-10-24
Payer: COMMERCIAL

## 2018-10-24 VITALS
BODY MASS INDEX: 37.49 KG/M2 | HEIGHT: 65 IN | OXYGEN SATURATION: 97 % | WEIGHT: 225 LBS | RESPIRATION RATE: 18 BRPM | HEART RATE: 82 BPM | TEMPERATURE: 97.9 F | SYSTOLIC BLOOD PRESSURE: 129 MMHG | DIASTOLIC BLOOD PRESSURE: 86 MMHG

## 2018-10-24 DIAGNOSIS — Z23 NEED FOR PROPHYLACTIC VACCINATION AND INOCULATION AGAINST INFLUENZA: ICD-10-CM

## 2018-10-24 DIAGNOSIS — G43.009 MIGRAINE WITHOUT AURA AND WITHOUT STATUS MIGRAINOSUS, NOT INTRACTABLE: ICD-10-CM

## 2018-10-24 DIAGNOSIS — M06.9 RHEUMATOID ARTHRITIS, INVOLVING UNSPECIFIED SITE, UNSPECIFIED RHEUMATOID FACTOR PRESENCE: ICD-10-CM

## 2018-10-24 DIAGNOSIS — E66.01 MORBID OBESITY (H): Primary | ICD-10-CM

## 2018-10-24 PROCEDURE — 90471 IMMUNIZATION ADMIN: CPT | Performed by: NURSE PRACTITIONER

## 2018-10-24 PROCEDURE — 99213 OFFICE O/P EST LOW 20 MIN: CPT | Mod: 25 | Performed by: NURSE PRACTITIONER

## 2018-10-24 PROCEDURE — 90686 IIV4 VACC NO PRSV 0.5 ML IM: CPT | Performed by: NURSE PRACTITIONER

## 2018-10-24 RX ORDER — SUMATRIPTAN 25 MG/1
25-50 TABLET, FILM COATED ORAL
Qty: 18 TABLET | Status: SHIPPED | OUTPATIENT
Start: 2018-10-24

## 2018-10-24 NOTE — MR AVS SNAPSHOT
After Visit Summary   10/24/2018    Paige Agosto    MRN: [id number]           Patient Information     Date Of Birth          1985        Visit Information        Provider Department      10/24/2018 7:30 AM Marybel Barnes NP [facility]        Today's Diagnoses     BMI > 35    -  1    Rheumatoid arthritis, involving unspecified site, unspecified rheumatoid factor presence (H)        Migraine without aura and without status migrainosus, not intractable        Need for prophylactic vaccination and inoculation against influenza           Follow-ups after your visit        Follow-up notes from your care team     Return in about 6 months (around 4/24/2019).      Who to contact     If you have questions or need follow up information about today's clinic visit or your schedule please contact [facility] directly at [phone].  Normal or non-critical lab and imaging results will be communicated to you by [employer], letter or phone within 4 business days after the clinic has received the results. If you do not hear from us within 7 days, please contact the clinic through [employer] or phone. If you have a critical or abnormal lab result, we will notify you by phone as soon as possible.  Submit refill requests through [employer] or call your pharmacy and they will forward the refill request to us. Please allow 3 business days for your refill to be completed.          Additional Information About Your Visit        MyChart Information     [employer] gives you secure access to your electronic health record. If you see a primary care provider, you can also send messages to your care team and make appointments. If you have questions, please call your primary care clinic.  If you do not have a primary care provider, please call [phone] and they will assist you.        Care EveryWhere ID     This is your Care EveryWhere ID. This could be used by other organizations to  "access your [place] medical records  [id number]        Your Vitals Were     Pulse Temperature Respirations Height Pulse Oximetry BMI (Body Mass Index)    82 97.9  F (36.6  C) (Oral) 18 5' 4.5\" (1.638 m) 97% 38.02 kg/m2       Blood Pressure from Last 3 Encounters:   10/24/18 129/86   04/26/18 112/82   04/10/18 118/82    Weight from Last 3 Encounters:   10/24/18 225 lb (102.1 kg)   04/26/18 235 lb 1.6 oz (106.6 kg)   04/10/18 235 lb 11.2 oz (106.9 kg)              We Performed the Following     FLU VACCINE, SPLIT VIRUS, IM (QUADRIVALENT) [86585]- >3 YRS     Vaccine Administration, Initial [79840]          Where to get your medicines      These medications were sent to [facility] [address] [address] AT SEC of [facility] [address]  [address], [facility] [address]     Phone:  [phone]     SUMAtriptan 25 MG tablet          Primary Care Provider Office Phone # Fax #    Leonachristine Allison Johnston [phone] [address] [place] [address]        Equal Access to Services     KELLY CUEVAS AH: Hadii roosevelt garcia hadasho Soomaali, waaxda luqadaha, qaybta kaalmada adeegyada, [place] gottlieb . So [facility] [phone].    ATENCIÓN: Si habla español, tiene a groves disposición servicios gratuitos de asistencia lingüística. ame al [phone].    We comply with applicable federal civil rights laws and [place] laws. We do not discriminate on the basis of race, color, national origin, age, disability, sex, sexual orientation, or gender identity.            Thank you!     Thank you for choosing [facility]  for your care. Our goal is always to provide you with excellent care. Hearing back from our patients is one way we can continue to improve our services. Please take a few minutes to complete the written survey that you may receive in the mail after your visit with us. Thank you!             Your Updated Medication List " - Protect others around you: Learn how to safely use, store and throw away your medicines at www.disposemymeds.org.          This list is accurate as of 10/24/18  1:30 PM.  Always use your most recent med list.                   Brand Name Dispense Instructions for use Diagnosis    ADVIL PO      Take by mouth as needed for moderate pain        albuterol 108 (90 Base) MCG/ACT inhaler    PROAIR HFA/PROVENTIL HFA/VENTOLIN HFA     Inhale 2 puffs into the lungs every 6 hours        Biotin w/ Vitamins C & E 1250-7.5-7.5 MCG-MG-UNT Chew           FOLIC ACID PO      Take 1 mg by mouth daily        predniSONE 5 MG tablet    DELTASONE     Take 1 tablet (5 mg) by mouth daily    RA (rheumatoid arthritis) (H)       SUMAtriptan 25 MG tablet    IMITREX    18 tablet    Take 1-2 tablets (25-50 mg) by mouth at onset of headache for migraine May repeat in 2 hours. Max 8 tablets/24 hours.    Migraine without aura and without status migrainosus, not intractable       traMADol 50 MG tablet    ULTRAM     Take 1 tablet by mouth every 6 hours as needed

## 2018-10-24 NOTE — PROGRESS NOTES
"  SUBJECTIVE:   Paige Agosto is a 33 year old female who presents to clinic today for the following health issues:    Form    She has a form for work that is due today that needs to be completed.  Normally is seen at the [facility].  Due to a high BMI, this form requires having a plan to manage.  She tries to get physical activity 3-5 days a week, uses a fitbit.  Tracks her diet through [employer].   She tries to follow an anti-inflammatory diet.  She does have RA, has flare ups about every 3 months which requires Prednisone, which also makes it difficult for her to lose weight.           Problem list and histories reviewed & adjusted, as indicated.  Additional history: as documented        Reviewed and updated as needed this visit by clinical staff  Allergies  Meds       Reviewed and updated as needed this visit by Provider         ROS:  CONSTITUTIONAL: NEGATIVE for fever, chills, change in weight  ENT/MOUTH: NEGATIVE for ear, mouth and throat problems  RESP: NEGATIVE for significant cough or SOB  CV: NEGATIVE for chest pain, palpitations or peripheral edema  GI: NEGATIVE for nausea, abdominal pain, heartburn, or change in bowel habits  MUSCULOSKELETAL: joint pain  NEURO: NEGATIVE for weakness, dizziness or paresthesias  PSYCHIATRIC: NEGATIVE for changes in mood or affect    OBJECTIVE:     /86  Pulse 82  Temp 97.9  F (36.6  C) (Oral)  Resp 18  Ht 5' 4.5\" (1.638 m)  Wt 225 lb (102.1 kg)  SpO2 97%  BMI 38.02 kg/m2  Body mass index is 38.02 kg/(m^2).  GENERAL: healthy, alert and no distress  PSYCH: mentation appears normal, affect normal/bright        ASSESSMENT/PLAN:             1. BMI > 35  Discussed diet and exercise. Form completed.     2. Rheumatoid arthritis, involving unspecified site, unspecified rheumatoid factor presence (H)  She follows with rheumatology.     3. Migraine without aura and without status migrainosus, not intractable  The current medical regimen is effective;  continue " present plan and medications.   Refills given.   - SUMAtriptan (IMITREX) 25 MG tablet; Take 1-2 tablets (25-50 mg) by mouth at onset of headache for migraine May repeat in 2 hours. Max 8 tablets/24 hours.  Dispense: 18 tablet; Refill: PRN    4. Need for prophylactic vaccination and inoculation against influenza    - FLU VACCINE, SPLIT VIRUS, IM (QUADRIVALENT) [03333]- >3 YRS  - Vaccine Administration, Initial [15989]        Marybel Barnes NP  [facility]            Prior to injection verified patient identity using patient's name and date of birth.  Due to injection administration, patient instructed to remain in clinic for 15 minutes  afterwards, and to report any adverse reaction to me immediately.        Injectable Influenza Immunization Documentation    1.  Is the person to be vaccinated sick today?   No    2. Does the person to be vaccinated have an allergy to a component   of the vaccine?   No  Egg Allergy Algorithm Link    3. Has the person to be vaccinated ever had a serious reaction   to influenza vaccine in the past?   No    4. Has the person to be vaccinated ever had Guillain-Barré syndrome?   No    Form completed by Jeanine Cervantes MA

## 2019-01-07 NOTE — PROGRESS NOTES
SUBJECTIVE:   CC: Paige Agosto is an 33 year old woman who presents for preventive health visit.     Physical   Annual:     Getting at least 3 servings of Calcium per day:  Yes    Bi-annual eye exam:  Yes    Dental care twice a year:  Yes    Sleep apnea or symptoms of sleep apnea:  Daytime drowsiness    Diet:  Regular (no restrictions)    Frequency of exercise:  2-3 days/week    Duration of exercise:  30-45 minutes    Taking medications regularly:  Yes    Medication side effects:  Not applicable    Additional concerns today:  Yes (wants to order genetic testing, wants to do STD testing)    PHQ-2 Total Score: 0    BRCA 1 Positive  Wants to schedule a mammo on 2/25/19 or 2/26/19. Patient has fam hx of breast cancer and is BRCA1 positive. Insurance doesn't cover mammos until she is 34.     Rheumatoid arthritis  Followed by rheumatologist - off of immunologics.  Takes prednisone prn for flairs.    Migraine Follow-Up    Headaches symptoms:  Stable    Frequency: Intermittent     Duration of headaches: 1 day    Able to do normal daily activities/work with migraines: No - ends up sleeping all day.    Rescue/Relief medication:ibuprofen (Advil, Motrin) and sumatriptan (Imitrex)              Effectiveness: moderate relief    Preventative medication: Sumatriptan    Neurologic complications: No new stroke-like symptoms, loss of vision or speech, numbness or weakness    In the past 4 weeks, how often have you gone to Urgent Care or the emergency room because of your headaches?  0    Today's PHQ-2 Score:   PHQ-2 ( 1999 Pfizer) 1/10/2019   Q1: Little interest or pleasure in doing things 0   Q2: Feeling down, depressed or hopeless 0   PHQ-2 Score 0   Q1: Little interest or pleasure in doing things Not at all   Q2: Feeling down, depressed or hopeless Not at all   PHQ-2 Score 0       Abuse: Current or Past(Physical, Sexual or Emotional)- No  Do you feel safe in your environment? Yes    Social History     Tobacco Use     Smoking  status: Never Smoker     Smokeless tobacco: Never Used   Substance Use Topics     Alcohol use: No     Alcohol/week: 0.0 oz     Alcohol Use 1/10/2019   If you drink alcohol do you typically have greater than 3 drinks per day OR greater than 7 drinks per week? No   No flowsheet data found.    Reviewed orders with patient.  Reviewed health maintenance and updated orders accordingly - Yes  Labs reviewed in EPIC  BP Readings from Last 3 Encounters:   01/10/19 112/84   10/24/18 129/86   04/26/18 112/82    Wt Readings from Last 3 Encounters:   01/10/19 104.5 kg (230 lb 4.8 oz)   10/24/18 102.1 kg (225 lb)   04/26/18 106.6 kg (235 lb 1.6 oz)                  Patient Active Problem List   Diagnosis     CARDIOVASCULAR SCREENING; LDL GOAL LESS THAN 160     Rheumatoid arthritis (H)     Chronic steroid use     BRCA1 positive     Mixed hyperlipidemia     Migraine without aura and without status migrainosus, not intractable     BMI > 35     Past Surgical History:   Procedure Laterality Date     C ANESTH,ARTHROSCOP PROC,ANKLE &/OR FOOT      ankle surgery, right side     DENTAL SURGERY         Social History     Tobacco Use     Smoking status: Never Smoker     Smokeless tobacco: Never Used   Substance Use Topics     Alcohol use: No     Alcohol/week: 0.0 oz     Family History   Problem Relation Age of Onset     Diabetes Father      Prostate Cancer Father      Bladder Cancer Father      Rectal Cancer Father      Cancer Paternal Grandfather         Lung     Alcohol/Drug Sister      Breast Cancer Paternal Grandmother      Breast Cancer Cousin 26         Current Outpatient Medications   Medication Sig Dispense Refill     albuterol (PROAIR HFA, PROVENTIL HFA, VENTOLIN HFA) 108 (90 BASE) MCG/ACT inhaler Inhale 2 puffs into the lungs every 6 hours       Biotin w/ Vitamins C & E 1250-7.5-7.5 MCG-MG-UNT CHEW        FOLIC ACID PO Take 1 mg by mouth daily       Ibuprofen (ADVIL PO) Take by mouth as needed for moderate pain       predniSONE  (DELTASONE) 5 MG tablet Take 1 tablet (5 mg) by mouth daily       SUMAtriptan (IMITREX) 25 MG tablet Take 1-2 tablets (25-50 mg) by mouth at onset of headache for migraine May repeat in 2 hours. Max 8 tablets/24 hours. 18 tablet PRN     traMADol (ULTRAM) 50 MG tablet Take 1 tablet by mouth every 6 hours as needed       Allergies   Allergen Reactions     Cats      Recent Labs   Lab Test 03/26/18 09/10/15  0941 07/15/12  1146 04/28/11  0827   A1C  --  5.2  --   --    LDL  --  158*  --  108   HDL  --  52  --  47*   TRIG  --  64  --  174*   ALT 37*  --   --   --    CR 0.74  --   --  0.58   GFRESTIMATED 106  --   --  >90   GFRESTBLACK 122  --   --  >90   POTASSIUM  --   --   --  3.4   TSH  --  0.99 3.07 3.11        Alternate mammogram schedule due to breast cancer history BRCA 1    Pertinent mammograms are reviewed under the imaging tab.  History of abnormal Pap smear: NO - age 30- 65 PAP every 3 years recommended  PAP / HPV Latest Ref Rng & Units 10/26/2016 10/9/2013 6/27/2012   PAP - NIL NIL NIL   HPV 16 DNA NEG Negative - -   HPV 18 DNA NEG Negative - -   OTHER HR HPV NEG Negative - -     Reviewed and updated as needed this visit by clinical staff  Tobacco  Allergies  Meds  Med Hx  Surg Hx  Fam Hx  Soc Hx        Reviewed and updated as needed this visit by Provider            Review of Systems   Constitutional: Negative for chills and fever.   HENT: Negative for congestion, ear pain and sore throat.    Eyes: Negative for pain and visual disturbance.   Respiratory: Negative for cough and shortness of breath.    Cardiovascular: Negative for chest pain, palpitations and peripheral edema.   Gastrointestinal: Negative for abdominal pain, constipation, diarrhea, heartburn, hematochezia and nausea.   Breasts:  Negative for tenderness, breast mass and discharge.   Genitourinary: Negative for dysuria, frequency, genital sores, hematuria, pelvic pain, urgency, vaginal bleeding and vaginal discharge.   Musculoskeletal:  "Negative for joint swelling and myalgias.   Skin: Negative for rash.   Neurological: Negative for dizziness, weakness, headaches and paresthesias.   Psychiatric/Behavioral: Negative for mood changes. The patient is not nervous/anxious.         OBJECTIVE:   /84   Pulse 90   Temp 97.9  F (36.6  C) (Oral)   Ht 1.647 m (5' 4.84\")   Wt 104.5 kg (230 lb 4.8 oz)   LMP 12/19/2018 (Approximate)   SpO2 97%   BMI 38.51 kg/m    Physical Exam  GENERAL: healthy, alert and no distress  EYES: Eyes grossly normal to inspection, PERRL and conjunctivae and sclerae normal  HENT: ear canals and TM's normal, nose and mouth without ulcers or lesions  NECK: no adenopathy, no asymmetry, masses, or scars and thyroid normal to palpation  RESP: lungs clear to auscultation - no rales, rhonchi or wheezes  CV: regular rate and rhythm, normal S1 S2, no S3 or S4, no murmur, click or rub, no peripheral edema and peripheral pulses strong  ABDOMEN: soft, nontender, no hepatosplenomegaly, no masses and bowel sounds normal   (female): normal female external genitalia, normal urethral meatus, vaginal mucosa pink, moist, well rugated, and normal cervix/adnexa/uterus without masses or discharge  MS: no gross musculoskeletal defects noted, no edema  SKIN: no suspicious lesions or rashes  NEURO: Normal strength and tone, mentation intact and speech normal  PSYCH: mentation appears normal, affect normal/bright    Diagnostic Test Results:  none     ASSESSMENT/PLAN:   1. Routine general medical examination at a health care facility  34 yo pt with hx of RA, BRCA 1 gene mutation positive, and migraines here for annual physical.    2. BRCA1 gene mutation positive in female  Issue with medical affordability - insurance will not pay for recommended genetic counseling or screenings.  With BRCA 1 gene mutation, pt should be getting yearly mammograms and Q6 month TV ultrasounds.  Should strongly consider bilateral mastectomy and bilateral oophorectomy, " "which needs to be a conversation guided by genetic counselor.  I strongly recommend she see a genetic counselor for further testing and recommendations.  Will place referral for consultation as well as care coordination to assist with financial burden.  - US Pelvic Complete w Transvaginal; Future  - *MA Screening Digital Bilateral; Future  - CANCER RISK MGMT/CANCER GENETIC COUNSELING REFERRAL  - CARE COORDINATION REFERRAL    3. Rheumatoid arthritis, involving unspecified site, unspecified rheumatoid factor presence (H)  Is under the care of a rheumatologist, but does not follow-up regularly due to cost.  Not on any immunologic therapy.  Discontinued methotrexate.  Reports daily morning stiffness and occasional joint pain - no inflamed or deformed joints on exam today.  Again, strongly recommend she reestablish care with her specialist and will refer to care coordination for financial assistance.  - CARE COORDINATION REFERRAL    4. Migraine without aura and without status migrainosus, not intractable  Stable, well controlled at this time.    5. Screen for STD (sexually transmitted disease)  Routine age-appropriate screening.  - HIV Antigen Antibody Combo  - Chlamydia trachomatis PCR  - Neisseria gonorrhoeae PCR  - Treponema Abs w Reflex to RPR and Titer    6. Screening for cervical cancer  - Pap imaged thin layer screen with HPV - recommended age 30 - 65 years (select HPV order below)  - HPV High Risk Types DNA Cervical    COUNSELING:  Reviewed preventive health counseling, as reflected in patient instructions    BP Readings from Last 1 Encounters:   01/10/19 112/84     Estimated body mass index is 38.51 kg/m  as calculated from the following:    Height as of this encounter: 1.647 m (5' 4.84\").    Weight as of this encounter: 104.5 kg (230 lb 4.8 oz).           reports that  has never smoked. she has never used smokeless tobacco.      Counseling Resources:  ATP IV Guidelines  Pooled Cohorts Equation " Calculator  Breast Cancer Risk Calculator  FRAX Risk Assessment  ICSI Preventive Guidelines  Dietary Guidelines for Americans, 2010  [email or web address]'s MyPlate  ASA Prophylaxis  Lung CA Screening    Jeremy Johnston MD  [facility]

## 2019-01-07 NOTE — PATIENT INSTRUCTIONS
Please send me your lab results including cholesterol      Preventive Health Recommendations  Female Ages 26 - 39  Yearly exam:   See your health care provider every year in order to    Review health changes.     Discuss preventive care.      Review your medicines if you your doctor has prescribed any.    Until age 30: Get a Pap test every three years (more often if you have had an abnormal result).    After age 30: Talk to your doctor about whether you should have a Pap test every 3 years or have a Pap test with HPV screening every 5 years.   You do not need a Pap test if your uterus was removed (hysterectomy) and you have not had cancer.  You should be tested each year for STDs (sexually transmitted diseases), if you're at risk.   Talk to your provider about how often to have your cholesterol checked.  If you are at risk for diabetes, you should have a diabetes test (fasting glucose).  Shots: Get a flu shot each year. Get a tetanus shot every 10 years.   Nutrition:     Eat at least 5 servings of fruits and vegetables each day.    Eat whole-grain bread, whole-wheat pasta and brown rice instead of white grains and rice.    Get adequate Calcium and Vitamin D.     Lifestyle    Exercise at least 150 minutes a week (30 minutes a day, 5 days of the week). This will help you control your weight and prevent disease.    Limit alcohol to one drink per day.    No smoking.     Wear sunscreen to prevent skin cancer.    See your dentist every six months for an exam and cleaning.

## 2019-01-10 ENCOUNTER — OFFICE VISIT (OUTPATIENT)
Dept: PEDIATRICS | Facility: CLINIC | Age: 34
End: 2019-01-10
Payer: COMMERCIAL

## 2019-01-10 VITALS
DIASTOLIC BLOOD PRESSURE: 84 MMHG | BODY MASS INDEX: 38.37 KG/M2 | OXYGEN SATURATION: 97 % | WEIGHT: 230.3 LBS | HEIGHT: 65 IN | SYSTOLIC BLOOD PRESSURE: 112 MMHG | TEMPERATURE: 97.9 F | HEART RATE: 90 BPM

## 2019-01-10 DIAGNOSIS — Z15.01 BRCA1 GENE MUTATION POSITIVE IN FEMALE: ICD-10-CM

## 2019-01-10 DIAGNOSIS — Z15.09 BRCA1 GENE MUTATION POSITIVE IN FEMALE: ICD-10-CM

## 2019-01-10 DIAGNOSIS — Z00.00 ROUTINE GENERAL MEDICAL EXAMINATION AT A HEALTH CARE FACILITY: Primary | ICD-10-CM

## 2019-01-10 DIAGNOSIS — M06.9 RHEUMATOID ARTHRITIS, INVOLVING UNSPECIFIED SITE, UNSPECIFIED RHEUMATOID FACTOR PRESENCE: ICD-10-CM

## 2019-01-10 DIAGNOSIS — Z11.3 SCREEN FOR STD (SEXUALLY TRANSMITTED DISEASE): ICD-10-CM

## 2019-01-10 DIAGNOSIS — Z12.4 SCREENING FOR CERVICAL CANCER: ICD-10-CM

## 2019-01-10 DIAGNOSIS — G43.009 MIGRAINE WITHOUT AURA AND WITHOUT STATUS MIGRAINOSUS, NOT INTRACTABLE: ICD-10-CM

## 2019-01-10 DIAGNOSIS — Z15.02 BRCA1 GENE MUTATION POSITIVE IN FEMALE: ICD-10-CM

## 2019-01-10 PROCEDURE — 36415 COLL VENOUS BLD VENIPUNCTURE: CPT | Performed by: INTERNAL MEDICINE

## 2019-01-10 PROCEDURE — 86780 TREPONEMA PALLIDUM: CPT | Performed by: INTERNAL MEDICINE

## 2019-01-10 PROCEDURE — G0145 SCR C/V CYTO,THINLAYER,RESCR: HCPCS | Performed by: INTERNAL MEDICINE

## 2019-01-10 PROCEDURE — 87624 HPV HI-RISK TYP POOLED RSLT: CPT | Performed by: INTERNAL MEDICINE

## 2019-01-10 PROCEDURE — 87491 CHLMYD TRACH DNA AMP PROBE: CPT | Performed by: INTERNAL MEDICINE

## 2019-01-10 PROCEDURE — 99395 PREV VISIT EST AGE 18-39: CPT | Performed by: INTERNAL MEDICINE

## 2019-01-10 PROCEDURE — 87389 HIV-1 AG W/HIV-1&-2 AB AG IA: CPT | Performed by: INTERNAL MEDICINE

## 2019-01-10 PROCEDURE — 87591 N.GONORRHOEAE DNA AMP PROB: CPT | Performed by: INTERNAL MEDICINE

## 2019-01-10 ASSESSMENT — ENCOUNTER SYMPTOMS
EYE PAIN: 0
DYSURIA: 0
ABDOMINAL PAIN: 1
HEMATURIA: 0
ABDOMINAL PAIN: 0
JOINT SWELLING: 0
BREAST MASS: 0
DIARRHEA: 0
HEARTBURN: 0
NERVOUS/ANXIOUS: 0
FREQUENCY: 0
SORE THROAT: 0
MYALGIAS: 0
DIZZINESS: 0
HEADACHES: 0
CONSTIPATION: 0
PALPITATIONS: 0
COUGH: 0
CHILLS: 0
JOINT SWELLING: 1
SHORTNESS OF BREATH: 0
PARESTHESIAS: 0
HEADACHES: 1
NAUSEA: 0
WEAKNESS: 0
HEMATOCHEZIA: 0
FEVER: 0

## 2019-01-10 ASSESSMENT — MIFFLIN-ST. JEOR: SCORE: 1743.01

## 2019-01-11 LAB
C TRACH DNA SPEC QL NAA+PROBE: NEGATIVE
HIV 1+2 AB+HIV1 P24 AG SERPL QL IA: NONREACTIVE
N GONORRHOEA DNA SPEC QL NAA+PROBE: NEGATIVE
SPECIMEN SOURCE: NORMAL
SPECIMEN SOURCE: NORMAL
T PALLIDUM AB SER QL: NONREACTIVE

## 2019-01-14 ENCOUNTER — PATIENT OUTREACH (OUTPATIENT)
Dept: CARE COORDINATION | Facility: CLINIC | Age: 34
End: 2019-01-14

## 2019-01-14 LAB
COPATH REPORT: NORMAL
PAP: NORMAL

## 2019-01-14 NOTE — PROGRESS NOTES
"Clinic Care Coordination Contact  [facility]/Voicemail      RN CC received and reviewed referral from PCP:  \"Reason for Referral: Financial Support: Medication Affordability      Additional pertinent details:  Has BRCA 1 gene mutation and should be getting yearly mammograms and Q6 month TV ultrasounds.  Should strongly consider bilateral mastectomy and bilateral oophorectomy.  Needs to see genetic counselor for further testing and recommendations.  Also has rheumatoid arthitis.     Has not been getting these medically necessary tests and has not been going to specialty appointments due to out-of-pocket costs.     Clinical Staff have discussed the Care Coordination Referral with the patient and/or caregiver: yes\"    Referral Source: PCP  Clinical Data: Care Coordinator Outreach  Outreach attempted x 1.  Left message on [employer] with call back information and requested return call.  Plan: Care Coordinator will try to reach patient again in 3-5 business days.    Dana Shay RN  Clinic Care Coordinator  [phone]    "

## 2019-01-16 LAB
FINAL DIAGNOSIS: NORMAL
HPV HR 12 DNA CVX QL NAA+PROBE: NEGATIVE
HPV16 DNA SPEC QL NAA+PROBE: NEGATIVE
HPV18 DNA SPEC QL NAA+PROBE: NEGATIVE
SPECIMEN DESCRIPTION: NORMAL
SPECIMEN SOURCE CVX/VAG CYTO: NORMAL

## 2019-01-18 ENCOUNTER — TELEPHONE (OUTPATIENT)
Dept: ONCOLOGY | Facility: CLINIC | Age: 34
End: 2019-01-18

## 2019-01-18 ASSESSMENT — ACTIVITIES OF DAILY LIVING (ADL): DEPENDENT_IADLS:: INDEPENDENT

## 2019-01-18 NOTE — TELEPHONE ENCOUNTER
ONCOLOGY INTAKE: Records Information      APPT INFORMATION: 05/08 herbert ARROYO  Referring provider:  Caty Johnston Mai, MD  Referring provider s clinic:  [facility] in [place]  Reason for visit/diagnosis:  BRCA1 gene mutation positive in female [Z15.01, Z15.02, Z15.09:     Were the records received with the referral (via [employer])? Complete    Has patient been seen for any external appt for this diagnosis (enter clinic/location)? No    Dx: BRCA1 gene mutation positive in female [Z15.01, Z15.02, Z15.09: Caller Dana from [facility] in [place]: Ref by Caty Johnston Mai, MD: Records In Epic

## 2019-01-18 NOTE — PROGRESS NOTES
"Clinic Care Coordination Contact    Clinic Care Coordination Contact  OUTREACH    Referral Information:  Referral Source: PCP    Primary Diagnosis: Other (include Comment box)    Chief Complaint   Patient presents with     Clinic Care Coordination - Initial     assistance navigating recommended follow up care       Clinical Concerns:  Patient indicates she learned she had the BRCA-1 gene mutation \"several years ago\" as part of research study done with a [place]; therefor, the information never translated to her medical record.  This has then precluded her from meeting insurance requirements to have early and ongoing diagnostic surveillance.   Patient is now at the point in her life in which she wants to pursue genetic counseling and increase her opportunities to explore options that may allow or promote early detection and ongoing surveillance. Patient is also concerned about choosing an In-Network provider, she carries [facility].     RN CC assisted patient in conference calling with member services; however, it was a line associated with [employer] [place], so directed us to the member webiste for [facility].  Together while on phone, RN CC used online tool to search for in network genetic counseling: search yielded 8 results- many of them for pediatric patients only.     RN CC contacted [facility] and soonest available appointment was 05/08/2019; the  representative indicated there is a new provider starting in the upcoming 1-2 weeks which may open up availability.    Regarding her mammogram, the patient wishes to wait until she is 34 (in 1 month) to assure adequate insurance coverage for that.      Financial/Insurance:   Financial/Insurance concerns (GOAL)::Yes  [facility], employer sponsored plans; patient wishes to be mindful of staying with In Network providers for all specialty.         Patient/Caregiver understanding: Patient verbalized understanding, engaged in " AIDET communication behavior during encounter.       Future Appointments              In 3 months Marilin English,  Cancer Risk Management Program, [facility]          Plan:   Patient currently scheduled with genetic counseling on 5/9/19-RN CC to route encounter note to PCP and if this is not satisfactory, will request provider to provider to request sooner date.  Also, patient will call in 1 week to see if new provider has sooner availability.     Patient agreeable to RN CC outreach in 1 month to help establish appointment for mammogram.     Dana Shay RN   Clinic Care Coordinator-[facility]  PH: [phone]

## 2019-01-21 ENCOUNTER — TELEPHONE (OUTPATIENT)
Dept: PEDIATRICS | Facility: CLINIC | Age: 34
End: 2019-01-21

## 2019-01-21 DIAGNOSIS — Z15.01 BRCA1 POSITIVE: Primary | ICD-10-CM

## 2019-01-21 DIAGNOSIS — Z15.09 BRCA1 POSITIVE: Primary | ICD-10-CM

## 2019-01-21 NOTE — TELEPHONE ENCOUNTER
Huddled with Caty.  OK to give referral to pt for [facility], but inform pt to check with insurance for her benefit coverage, prior to going to an outside facility.      Called pt-no answer.  Left a message for the pt to call the clinic.    Sherry Lord RN    
Pt returned phone call.  Informed pt of 's response.  Pt is in agreement with this and is still requesting referral to [facility].  She will definitely contact her insurance company for coverage.    Referral placed and faxed to [facility].      
Reason for call:  Order   Order or referral being requested: Genetic counseling    Date needed: as soon as possible  Has the patient been seen by the PCP for this problem? YES    Additional comments: Patient is requesting that a referral for genetic counseling be faxed to [facility] at [phone] ATTN GENETIC COUNSELING.     She would like a referral to [facility] due to [facility] next opening being in May 2019.     Please notify patient when referral is sent.     Phone number to reach patient:  Home number on file [phone] (home)    Best Time:  any    Can we leave a detailed message on this number?  YES    
Yes

## 2019-02-18 ENCOUNTER — PATIENT OUTREACH (OUTPATIENT)
Dept: CARE COORDINATION | Facility: CLINIC | Age: 34
End: 2019-02-18

## 2019-02-18 NOTE — PROGRESS NOTES
Clinic Care Coordination Contact  [facility]/Voicemail    RN CC follow-up outreach; patient recommended for screening exams (mammogram and pelvic ultrasound) due to  BRCA 1 positive gene. Patient declined screening exams until age 34, which will be next week. RN CC agreed to outreach closer to that date to help facilitate.     Clinical Data: Care Coordinator Outreach  Outreach attempted x 1.  Left message on voicemail with call back information and requested return call.  Plan: Care Coordinator will try to reach patient again in 3-5 business days.    Dana Shay RN   Clinic Care Coordinator-[facility]  PH: [phone]

## 2019-02-18 NOTE — LETTER
Health Care Home - Access Care Plan    About Me  Patient Name:  Paige Agosto    YOB: 1985  Age:                             33 year old   Lydia MRN:            [id number] Telephone Information:   Home Phone [phone]   Mobile [phone]       Address:    [address] [place] [address] Email address:  [email or web address]      Emergency Contact(s)  Name Relationship Lgl Grd Work Phone Home Phone Mobile Phone   1. CARMEN AGOSTO Father No  [phone]    2. COREEN AGOSTO Sister No  [phone]             Health Maintenance:      My Access Plan  Medical Emergency 911   Questions or concerns during clinic hours Primary Clinic Line, I will call the clinic directly: [facility] [place]  [phone]   24 Hour Appointment Line [phone] or  [phone]) (toll free)   24 Hour Nurse Line [phone] (toll free)   Questions or concerns outside clinic hours 24 Hour Appointment Line, I will call the after-hours on-call line:   [facility] [phone] or [phone] ([phone]) (toll-free)   Preferred Urgent Care     Preferred Hospital     Preferred Pharmacy [facility] - [address]     Behavioral Health Crisis Line The National Suicide Prevention Lifeline at 1-685.811.8556 or 911     My Care Team Members  Patient Care Team       Relationship Specialty Notifications Start End    Caty Johnston Mai, MD PCP - General Internal Medicine  4/30/18     Phone: [phone] Fax: [phone]         [address] [place] [address] [address] [place] [address]    Caty Johnston Mai, MD PCP - Assigned PCP   11/12/17     Phone: [phone] Fax: [phone]         [facility] [facility] [address] [place] [address]           My Medical and Care Information  Problem List   Patient Active Problem List   Diagnosis     CARDIOVASCULAR SCREENING; LDL GOAL LESS THAN 160     Rheumatoid arthritis (H)     Chronic steroid use     BRCA1 positive      Mixed hyperlipidemia     Migraine without aura and without status migrainosus, not intractable     BMI > 35

## 2019-02-18 NOTE — LETTER
[facility]  [address] [place] [address]  February 22, 2019    Paige Agosto  [address]   [facility] [place] [address]      Dear Paige,    I am a clinic care coordinator who works with Jeremy Johnston MD at [facility] I recently tried to call and was unable to reach you. I wanted to introduce myself and provide you with my contact information so that you can call me with questions or concerns about your health care. Below is a description of clinic care coordination and how I can further assist you.     The clinic care coordinator is a registered nurse and/or  who understand the health care system. The goal of clinic care coordination is to help you manage your health and improve access to the [facility] system in the most efficient manner. The registered nurse can assist you in meeting your health care goals by providing education, coordinating services, and strengthening the communication among your providers. The  can assist you with financial, behavioral, psychosocial, chemical dependency, counseling, and/or psychiatric resources.    Please feel free to contact me at [phone], with any questions or concerns. We at [facility] are focused on providing you with the highest-quality healthcare experience possible and that all starts with you.     Sincerely,     Dana Shay RN   Clinic Care Coordinator-[facility]  PH: [phone]    Enclosed: I have enclosed a copy of a 24 Hour Access Plan. This has helpful phone numbers for you to call when needed. Please keep this in an easy to access place to use as needed.

## 2019-02-22 NOTE — PROGRESS NOTES
Clinic Care Coordination Contact  Care Team Conversations    Patient returned RN CC phone call; she states at this time she is interested and willing to schedule screening mammogram, but does not wish to schedule her recommended pelvic ultrasound; she states she met with a genetic counselor at an outside facility on 2/21/19 and had labs drawn, once those are resulted patient is hopeful to get into a hereditary cancer OB/GYN provider recommended by the genetic counselor.     KENIA ANDREW facilitated 2/26/19 mammogram for 6:15 PM per patient request.     Dana Shay RN   Clinic Care Coordinator-[facility]  PH: [phone]

## 2019-02-22 NOTE — PROGRESS NOTES
Clinic Care Coordination Contact  [facility] [[email or web address]]/Voicemail       Clinical Data: Care Coordinator Outreach  Outreach attempted x 2.  Left message on voicemail with call back information and requested return call.  Plan: Care Coordinator will mail out care coordination introduction letter with care coordinator contact information and explanation of care coordination services. Care Coordinator will do no further outreaches at this time.    Dana Shay RN   Clinic Care Coordinator-[facility]  PH: [phone]

## 2019-02-26 ENCOUNTER — ANCILLARY PROCEDURE (OUTPATIENT)
Dept: MAMMOGRAPHY | Facility: CLINIC | Age: 34
End: 2019-02-26
Payer: COMMERCIAL

## 2019-02-26 DIAGNOSIS — Z15.09 BRCA1 GENE MUTATION POSITIVE IN FEMALE: ICD-10-CM

## 2019-02-26 DIAGNOSIS — Z15.02 BRCA1 GENE MUTATION POSITIVE IN FEMALE: ICD-10-CM

## 2019-02-26 DIAGNOSIS — Z15.01 BRCA1 GENE MUTATION POSITIVE IN FEMALE: ICD-10-CM

## 2019-02-26 PROCEDURE — 77067 SCR MAMMO BI INCL CAD: CPT | Mod: TC

## 2019-11-08 ENCOUNTER — HEALTH MAINTENANCE LETTER (OUTPATIENT)
Age: 34
End: 2019-11-08

## 2020-02-23 ENCOUNTER — HEALTH MAINTENANCE LETTER (OUTPATIENT)
Age: 35
End: 2020-02-23

## 2020-12-06 ENCOUNTER — HEALTH MAINTENANCE LETTER (OUTPATIENT)
Age: 35
End: 2020-12-06

## 2021-04-11 ENCOUNTER — HEALTH MAINTENANCE LETTER (OUTPATIENT)
Age: 36
End: 2021-04-11

## 2021-05-24 ENCOUNTER — TRANSFERRED RECORDS (OUTPATIENT)
Dept: HEALTH INFORMATION MANAGEMENT | Facility: CLINIC | Age: 36
End: 2021-05-24

## 2021-09-25 ENCOUNTER — HEALTH MAINTENANCE LETTER (OUTPATIENT)
Age: 36
End: 2021-09-25

## 2021-10-22 ENCOUNTER — IMMUNIZATION (OUTPATIENT)
Dept: PEDIATRICS | Facility: CLINIC | Age: 36
End: 2021-10-22
Payer: COMMERCIAL

## 2021-10-22 PROCEDURE — 90471 IMMUNIZATION ADMIN: CPT

## 2021-10-22 PROCEDURE — 90686 IIV4 VACC NO PRSV 0.5 ML IM: CPT

## 2022-01-15 ENCOUNTER — HEALTH MAINTENANCE LETTER (OUTPATIENT)
Age: 37
End: 2022-01-15

## 2022-05-07 ENCOUNTER — HEALTH MAINTENANCE LETTER (OUTPATIENT)
Age: 37
End: 2022-05-07

## 2023-01-07 ENCOUNTER — HEALTH MAINTENANCE LETTER (OUTPATIENT)
Age: 38
End: 2023-01-07

## 2023-04-22 ENCOUNTER — HEALTH MAINTENANCE LETTER (OUTPATIENT)
Age: 38
End: 2023-04-22

## 2023-06-02 ENCOUNTER — HEALTH MAINTENANCE LETTER (OUTPATIENT)
Age: 38
End: 2023-06-02

## 2024-06-29 ENCOUNTER — HEALTH MAINTENANCE LETTER (OUTPATIENT)
Age: 39
End: 2024-06-29